# Patient Record
Sex: FEMALE | Race: WHITE | NOT HISPANIC OR LATINO | Employment: OTHER | URBAN - METROPOLITAN AREA
[De-identification: names, ages, dates, MRNs, and addresses within clinical notes are randomized per-mention and may not be internally consistent; named-entity substitution may affect disease eponyms.]

---

## 2017-01-15 ENCOUNTER — TELEPHONE (OUTPATIENT)
Dept: NEUROSURGERY | Facility: CLINIC | Age: 52
End: 2017-01-15

## 2017-01-15 DIAGNOSIS — M50.00 HERNIATION OF INTERVERTEBRAL DISC OF CERVICAL SPINE WITH MYELOPATHY: Primary | ICD-10-CM

## 2017-02-03 ENCOUNTER — TELEPHONE (OUTPATIENT)
Dept: NEUROSURGERY | Facility: CLINIC | Age: 52
End: 2017-02-03

## 2017-02-03 NOTE — TELEPHONE ENCOUNTER
----- Message from Gloria Harmon sent at 2/2/2017  8:55 AM CST -----  Contact: Yanely Sarkar (Daughter)  Pt is scheduled for surgery on Feb 23, daughter would like to know if she will be in a private room for recovery? Would like to know so she can see if she will need to get a hotel or if she can stay in the room with her mother    Contact number [phone]  Thanks

## 2017-02-03 NOTE — TELEPHONE ENCOUNTER
SPOKE WITH PATIENT'S DAUGHTER INFORMED HER THAT IT WAS NOT A GUARANTEE THAT HER MOTHER WOULD BE ASSIGNED TO A PRIVATE ROOM. IT WOULD DEPEND SOLELY UPON AVAILABLE.

## 2017-02-14 RX ORDER — HYDROCODONE BITARTRATE AND ACETAMINOPHEN 5; 325 MG/1; MG/1
1 TABLET ORAL EVERY 6 HOURS PRN
COMMUNITY
End: 2022-04-25

## 2017-02-14 NOTE — PRE ADMISSION SCREENING
Anesthesiology Preliminary RN Case Review for Triage    Planned Procedure: Procedure(s) (LRB):  CERVICAL TOTAL DISC REPLACEMENT (N/A) (N/A)  Requested Anesthesia Type: General  Surgeon: Derrick Lara MD  Known or anticipated Date of Surgery: 2/23/2017    Accessible information regarding current medical status:  Surgeon notes: reviewed  Anesthesia questionnaire completed by patient: not available  Previous anesthesia records: Not available  Last PCP note: within 3 months , outside [facility]  received visit note from Dr Mayela Hendrix 1/18/17  (f/u arm pain)   Subspecialty notes: n/a, n/a    Records from recent hospitalization: not available hysterectomy in November done in [place]   Outside medical records: received  RN preliminary phone screening: n/a  Medication list: reviewed     Risk analysis from chart review  Planned surgery / procedure risk classification:  High risk, [place] 4  Functional Capacity, (based on chart review): Good, at least 4 METS  Predicted ASA Classification: To be determined  Cardiac Status: There are risk factors for CAD:  Hyperlipoproteinemia, Hypertension,  adequately controlled   Other important medical co-morbidities: to be determined  Testing Status:  Results have been reviewed. received outside labs dated 12/14/16 CBC, PT/INR, BMP; EKG dated 10/21/16 - scanned to media   LHC done 12/14/16 after ABN EKG on 10/21/16 - NORMAL CORONARY ARTERIES - done in [place] by Dr Johnston- report scanned to media     Plan  Testing:  Indicated, per Anesthesia Triage Guidelines.  T&S on arrival   Phone call:  If no findings of concern, proceed with anesthesia and surgery.  Anesthesia Visit:  n/a   Visit focus:  n/a  Consult:  to be determined  Indications:  tbd  Sub-specialist consult:   n/a  Indications:   n/a    Donato Marte,MSN,BA, RN-BC 2/14/17

## 2017-02-16 ENCOUNTER — ANESTHESIA EVENT (OUTPATIENT)
Dept: SURGERY | Facility: HOSPITAL | Age: 52
End: 2017-02-16
Payer: MEDICAID

## 2017-02-16 NOTE — ANESTHESIA PREPROCEDURE EVALUATION
Anesthesiology Preliminary RN Case Review for Triage    Planned Procedure: Procedure(s) (LRB):  CERVICAL TOTAL DISC REPLACEMENT (N/A) (N/A)  Requested Anesthesia Type: General  Surgeon: Derrick Lara MD  Known or anticipated Date of Surgery: 2/23/2017    Accessible information regarding current medical status:  Surgeon notes: reviewed  Anesthesia questionnaire completed by patient: not available  Previous anesthesia records: Not available  Last PCP note: within 3 months , outside [facility]  received visit note from Dr Mayela Hendrix 1/18/17  (f/u arm pain)   Subspecialty notes: n/a, n/a    Records from recent hospitalization: not available hysterectomy in November done in [place]   Outside medical records: received  RN preliminary phone screening: n/a  Medication list: reviewed     Risk analysis from chart review  Planned surgery / procedure risk classification:  High risk, [place] 4  Functional Capacity, (based on chart review): Good, at least 4 METS  Predicted ASA Classification: To be determined  Cardiac Status: There are risk factors for CAD:  Hyperlipoproteinemia, Hypertension,  adequately controlled   Other important medical co-morbidities: to be determined  Testing Status:  Results have been reviewed. received outside labs dated 12/14/16 CBC, PT/INR, BMP; EKG dated 10/21/16 - scanned to media     LHC done 12/14/16 after ABN EKG on 10/21/16 - NORMAL CORONARY ARTERIES - done in [place] by Dr Johnston- report scanned to media     Plan  Testing:  Indicated, per Anesthesia Triage Guidelines.  T&S on arrival   Phone call:  If no findings of concern, proceed with anesthesia and surgery.  Anesthesia Visit:  n/a   Visit focus:  n/a  Consult:  to be determined  Indications:  tbd  Sub-specialist consult:   n/a  Indications:   n/a    Donato Marte,MSN,BA, RN-BC 2/14/17 02/16/2017  Pre-operative evaluation  for Procedure(s) (LRB):  CERVICAL TOTAL DISC REPLACEMENT (N/A)    Harriett Gasca is a 51 y.o. female with PMHx significant for GERD, HTN, and HLD who presents for the above procedure for cervical radiculopathy.    There is no problem list on file for this patient.      Review of patient's allergies indicates:   Allergen Reactions    Codeine Hives    Corticosteroids (glucocorticoids) Edema     Unsure of which steroids    Demerol [meperidine] Other (See Comments)     Adverse reaction     Pcn [penicillins]     Sulfa (sulfonamide antibiotics) Hives     Hives and trouble breathing        No current facility-administered medications on file prior to encounter.      Current Outpatient Prescriptions on File Prior to Encounter   Medication Sig Dispense Refill    atorvastatin (LIPITOR) 20 MG tablet Take 20 mg by mouth every evening.       cyclobenzaprine (FLEXERIL) 10 MG tablet Take 10 mg by mouth.      furosemide (LASIX) 20 MG tablet Take 20 mg by mouth daily as needed.       gabapentin (NEURONTIN) 300 MG capsule Take 300 mg by mouth 3 (three) times daily.      ibuprofen (ADVIL,MOTRIN) 800 MG tablet Take 800 mg by mouth.      losartan-hydrochlorothiazide 50-12.5 mg (HYZAAR) 50-12.5 mg per tablet Take 1 tablet by mouth once daily.      potassium chloride (KLOR-CON) 10 MEQ TbSR Take 20 mEq by mouth continuous prn.       ranitidine (ZANTAC) 150 MG capsule Take 150 mg by mouth 2 (two) times daily.       trazodone (DESYREL) 100 MG tablet Take 100 mg by mouth nightly as needed.          Past Surgical History   Procedure Laterality Date    Hysterectomy       section      Lung biopsy         Social History     Social History    Marital status: Legally      Spouse name: N/A    Number of children: N/A    Years of education: N/A     Occupational History    Not on file.     Social History Main Topics    Smoking status: Never Smoker    Smokeless tobacco: Not on file    Alcohol use No     Drug use: No    Sexual activity: Not on file     Other Topics Concern    Not on file     Social History Narrative         Vital Signs Range (Last 24H):  Temp:  [36.5 °C (97.7 °F)]   Pulse:  [105]   Resp:  [18]   BP: (137)/(60)   SpO2:  [100 %]       CBC: No results for input(s): WBC, RBC, HGB, HCT, PLT, MCV, MCH, MCHC in the last 72 hours.    CMP: No results for input(s): NA, K, CL, CO2, BUN, CREATININE, GLU, MG, PHOS, CALCIUM, ALBUMIN, PROT, ALKPHOS, ALT, AST, BILITOT in the last 72 hours.    INR  No results for input(s): INR, PROTIME, APTT in the last 72 hours.    Invalid input(s): PT        Diagnostic Studies:      EKG:  See above    2D Echo:  See above for nursing report from [facility] Anesthesia Evaluation    I have reviewed the Patient Summary Reports.     I have reviewed the Medications.     Review of Systems  Anesthesia Hx:  No problems with previous Anesthesia  History of prior surgery of interest to airway management or planning:   Hematology/Oncology:  Hematology Normal   Oncology Normal     EENT/Dental:EENT/Dental Normal   Cardiovascular:   Exercise tolerance: good Denies Hypertension.   Denies Angina.        Pulmonary:  Pulmonary Normal    Renal/:  Renal/ Normal     Hepatic/GI:   GERD, poorly controlled  Esophageal / Stomach Disorders Gerd Controlled by chronic antireflux medication.    Musculoskeletal:  Spine Disorders: cervical  Cervical Spine Disorder    OB/GYN/PEDS:  Hysterectomy November 2016 in [place] ; prior neck surgery   Neurological:  Neurology Normal    Endocrine:   Denies Diabetes.    Dermatological:  Skin Normal        Physical Exam  General:  Well nourished    Airway/Jaw/Neck:  Airway Findings: Mouth Opening: Normal Tongue: Normal  General Airway Assessment: Adult  Mallampati: II  Improves to I with phonation.  TM Distance: Normal, at least 6 cm  Jaw/Neck Findings:  Neck ROM: Normal ROM      Dental:  Dental Findings: Upper Dentures   Chest/Lungs:  Chest/Lungs Findings: Clear  to auscultation, Normal Respiratory Rate     Heart/Vascular:  Heart Findings: Rate: Normal  Rhythm: Regular Rhythm  Sounds: Normal        Mental Status:  Mental Status Findings:  Cooperative, Alert and Oriented         Anesthesia Plan  Type of Anesthesia, risks & benefits discussed:  Anesthesia Type:  general  Patient's Preference:   Intra-op Monitoring Plan:   Intra-op Monitoring Plan Comments:   Post Op Pain Control Plan:   Post Op Pain Control Plan Comments:   Induction:   IV  Beta Blocker:  Patient is not currently on a Beta-Blocker (No further documentation required).       Informed Consent: Patient understands risks and agrees with Anesthesia plan.  Questions answered. Anesthesia consent signed with patient.  ASA Score: 2     Day of Surgery Review of History & Physical:    H&P update referred to the surgeon.     Anesthesia Plan Notes: GA, ETT, anesthetic c/w neuromonitoring (TIVA)  preop antacid and metoclopramide, pain med        Ready For Surgery From Anesthesia Perspective.

## 2017-02-21 ENCOUNTER — PROCEDURE VISIT (OUTPATIENT)
Dept: NEUROLOGY | Facility: CLINIC | Age: 52
End: 2017-02-21
Payer: MEDICAID

## 2017-02-21 ENCOUNTER — LAB VISIT (OUTPATIENT)
Dept: LAB | Facility: HOSPITAL | Age: 52
End: 2017-02-21
Attending: ANESTHESIOLOGY
Payer: MEDICAID

## 2017-02-21 DIAGNOSIS — M50.10 CERVICAL DISC DISORDER WITH RADICULOPATHY: ICD-10-CM

## 2017-02-21 DIAGNOSIS — G56.03 CARPAL TUNNEL SYNDROME ON BOTH SIDES: ICD-10-CM

## 2017-02-21 DIAGNOSIS — Z01.818 PREOP TESTING: ICD-10-CM

## 2017-02-21 LAB
ABO + RH BLD: NORMAL
BLD GP AB SCN CELLS X3 SERPL QL: NORMAL

## 2017-02-21 PROCEDURE — 36415 COLL VENOUS BLD VENIPUNCTURE: CPT

## 2017-02-21 PROCEDURE — 95911 NRV CNDJ TEST 9-10 STUDIES: CPT | Mod: 26,S$PBB,, | Performed by: PSYCHIATRY & NEUROLOGY

## 2017-02-21 PROCEDURE — 86900 BLOOD TYPING SEROLOGIC ABO: CPT

## 2017-02-21 PROCEDURE — 86901 BLOOD TYPING SEROLOGIC RH(D): CPT

## 2017-02-21 PROCEDURE — 95885 MUSC TST DONE W/NERV TST LIM: CPT | Mod: 26,59,S$PBB, | Performed by: PSYCHIATRY & NEUROLOGY

## 2017-02-21 PROCEDURE — 95886 MUSC TEST DONE W/N TEST COMP: CPT | Mod: 26,S$PBB,, | Performed by: PSYCHIATRY & NEUROLOGY

## 2017-02-22 ENCOUNTER — TELEPHONE (OUTPATIENT)
Dept: NEUROSURGERY | Facility: CLINIC | Age: 52
End: 2017-02-22

## 2017-02-22 NOTE — PRE-PROCEDURE INSTRUCTIONS
Spoke to patient. Discussed preop and medication instructions; NPO instructions included. Patient verbalized understanding of instructions.

## 2017-02-23 ENCOUNTER — ANESTHESIA (OUTPATIENT)
Dept: SURGERY | Facility: HOSPITAL | Age: 52
End: 2017-02-23
Payer: MEDICAID

## 2017-02-23 ENCOUNTER — HOSPITAL ENCOUNTER (OUTPATIENT)
Facility: HOSPITAL | Age: 52
Discharge: HOME | End: 2017-02-24
Attending: NEUROLOGICAL SURGERY | Admitting: NEUROLOGICAL SURGERY
Payer: MEDICAID

## 2017-02-23 DIAGNOSIS — M48.02 CERVICAL STENOSIS OF SPINAL CANAL: ICD-10-CM

## 2017-02-23 LAB
ANION GAP SERPL CALC-SCNC: 9 MMOL/L
BASOPHILS # BLD AUTO: 0.01 K/UL
BASOPHILS NFR BLD: 0.2 %
BUN SERPL-MCNC: 13 MG/DL
CALCIUM SERPL-MCNC: 7.9 MG/DL
CHLORIDE SERPL-SCNC: 108 MMOL/L
CO2 SERPL-SCNC: 22 MMOL/L
CREAT SERPL-MCNC: 0.8 MG/DL
DIFFERENTIAL METHOD: ABNORMAL
EOSINOPHIL # BLD AUTO: 0.1 K/UL
EOSINOPHIL NFR BLD: 0.8 %
ERYTHROCYTE [DISTWIDTH] IN BLOOD BY AUTOMATED COUNT: 13.2 %
EST. GFR  (AFRICAN AMERICAN): >60 ML/MIN/1.73 M^2
EST. GFR  (NON AFRICAN AMERICAN): >60 ML/MIN/1.73 M^2
GLUCOSE SERPL-MCNC: 106 MG/DL
HCT VFR BLD AUTO: 37.5 %
HGB BLD-MCNC: 12.5 G/DL
LYMPHOCYTES # BLD AUTO: 0.9 K/UL
LYMPHOCYTES NFR BLD: 15.4 %
MCH RBC QN AUTO: 28.2 PG
MCHC RBC AUTO-ENTMCNC: 33.3 %
MCV RBC AUTO: 85 FL
MONOCYTES # BLD AUTO: 0.2 K/UL
MONOCYTES NFR BLD: 3 %
NEUTROPHILS # BLD AUTO: 4.8 K/UL
NEUTROPHILS NFR BLD: 80.6 %
PLATELET # BLD AUTO: 153 K/UL
PMV BLD AUTO: 8.6 FL
POTASSIUM SERPL-SCNC: 4.2 MMOL/L
RBC # BLD AUTO: 4.43 M/UL
SODIUM SERPL-SCNC: 139 MMOL/L
WBC # BLD AUTO: 5.91 K/UL

## 2017-02-23 PROCEDURE — 22858 TOT DISC ARTHRP 2ND LVL CRV: CPT | Mod: ,,, | Performed by: NEUROLOGICAL SURGERY

## 2017-02-23 PROCEDURE — 71000033 HC RECOVERY, INTIAL HOUR: Performed by: NEUROLOGICAL SURGERY

## 2017-02-23 PROCEDURE — 25000003 PHARM REV CODE 250: Performed by: ANESTHESIOLOGY

## 2017-02-23 PROCEDURE — 25000003 PHARM REV CODE 250: Performed by: NEUROLOGICAL SURGERY

## 2017-02-23 PROCEDURE — 37000008 HC ANESTHESIA 1ST 15 MINUTES: Performed by: NEUROLOGICAL SURGERY

## 2017-02-23 PROCEDURE — D9220A PRA ANESTHESIA: Mod: ,,, | Performed by: ANESTHESIOLOGY

## 2017-02-23 PROCEDURE — 27000221 HC OXYGEN, UP TO 24 HOURS

## 2017-02-23 PROCEDURE — 94799 UNLISTED PULMONARY SVC/PX: CPT

## 2017-02-23 PROCEDURE — 80048 BASIC METABOLIC PNL TOTAL CA: CPT

## 2017-02-23 PROCEDURE — 63600175 PHARM REV CODE 636 W HCPCS: Performed by: ANESTHESIOLOGY

## 2017-02-23 PROCEDURE — 27200651 HC AIRWAY, LMA: Performed by: ANESTHESIOLOGY

## 2017-02-23 PROCEDURE — 27201423 OPTIME MED/SURG SUP & DEVICES STERILE SUPPLY: Performed by: NEUROLOGICAL SURGERY

## 2017-02-23 PROCEDURE — C1729 CATH, DRAINAGE: HCPCS | Performed by: NEUROLOGICAL SURGERY

## 2017-02-23 PROCEDURE — 36000710: Performed by: NEUROLOGICAL SURGERY

## 2017-02-23 PROCEDURE — 22856 TOT DISC ARTHRP 1NTRSPC CRV: CPT | Mod: ,,, | Performed by: NEUROLOGICAL SURGERY

## 2017-02-23 PROCEDURE — 36000711: Performed by: NEUROLOGICAL SURGERY

## 2017-02-23 PROCEDURE — 37000009 HC ANESTHESIA EA ADD 15 MINS: Performed by: NEUROLOGICAL SURGERY

## 2017-02-23 PROCEDURE — C1713 ANCHOR/SCREW BN/BN,TIS/BN: HCPCS | Performed by: NEUROLOGICAL SURGERY

## 2017-02-23 PROCEDURE — 85025 COMPLETE CBC W/AUTO DIFF WBC: CPT

## 2017-02-23 PROCEDURE — 27100025 HC TUBING, SET FLUID WARMER: Performed by: ANESTHESIOLOGY

## 2017-02-23 PROCEDURE — 94761 N-INVAS EAR/PLS OXIMETRY MLT: CPT

## 2017-02-23 PROCEDURE — 71000039 HC RECOVERY, EACH ADD'L HOUR: Performed by: NEUROLOGICAL SURGERY

## 2017-02-23 PROCEDURE — 63600175 PHARM REV CODE 636 W HCPCS: Performed by: NEUROLOGICAL SURGERY

## 2017-02-23 PROCEDURE — 27100021 HC MULTIPORT INFUSION MANIFOLD: Performed by: ANESTHESIOLOGY

## 2017-02-23 DEVICE — DISC CERVICAL MOBI-C 13X15X5MM: Type: IMPLANTABLE DEVICE | Site: NECK | Status: FUNCTIONAL

## 2017-02-23 DEVICE — IMPLANTABLE DEVICE: Type: IMPLANTABLE DEVICE | Site: NECK | Status: FUNCTIONAL

## 2017-02-23 RX ORDER — ROCURONIUM BROMIDE 10 MG/ML
INJECTION, SOLUTION INTRAVENOUS
Status: DISCONTINUED | OUTPATIENT
Start: 2017-02-23 | End: 2017-02-23

## 2017-02-23 RX ORDER — BACITRACIN 50000 [IU]/1
INJECTION, POWDER, FOR SOLUTION INTRAMUSCULAR
Status: DISCONTINUED | OUTPATIENT
Start: 2017-02-23 | End: 2017-02-23 | Stop reason: HOSPADM

## 2017-02-23 RX ORDER — PHENYLEPHRINE HYDROCHLORIDE 10 MG/ML
INJECTION INTRAVENOUS
Status: DISCONTINUED | OUTPATIENT
Start: 2017-02-23 | End: 2017-02-23

## 2017-02-23 RX ORDER — LORAZEPAM 2 MG/ML
0.25 INJECTION INTRAMUSCULAR ONCE AS NEEDED
Status: DISCONTINUED | OUTPATIENT
Start: 2017-02-23 | End: 2017-02-23 | Stop reason: HOSPADM

## 2017-02-23 RX ORDER — HYDROCODONE BITARTRATE AND ACETAMINOPHEN 5; 325 MG/1; MG/1
1 TABLET ORAL EVERY 4 HOURS PRN
Status: DISCONTINUED | OUTPATIENT
Start: 2017-02-23 | End: 2017-02-24 | Stop reason: HOSPADM

## 2017-02-23 RX ORDER — SODIUM CHLORIDE 0.9 % (FLUSH) 0.9 %
3 SYRINGE (ML) INJECTION
Status: DISCONTINUED | OUTPATIENT
Start: 2017-02-23 | End: 2017-02-23

## 2017-02-23 RX ORDER — BISACODYL 10 MG
10 SUPPOSITORY, RECTAL RECTAL DAILY
Status: DISCONTINUED | OUTPATIENT
Start: 2017-02-24 | End: 2017-02-24 | Stop reason: HOSPADM

## 2017-02-23 RX ORDER — MAG HYDROX/ALUMINUM HYD/SIMETH 200-200-20
30 SUSPENSION, ORAL (FINAL DOSE FORM) ORAL EVERY 4 HOURS PRN
Status: DISCONTINUED | OUTPATIENT
Start: 2017-02-23 | End: 2017-02-24 | Stop reason: HOSPADM

## 2017-02-23 RX ORDER — LIDOCAINE HYDROCHLORIDE 10 MG/ML
1 INJECTION, SOLUTION EPIDURAL; INFILTRATION; INTRACAUDAL; PERINEURAL ONCE
Status: COMPLETED | OUTPATIENT
Start: 2017-02-23 | End: 2017-02-23

## 2017-02-23 RX ORDER — METOCLOPRAMIDE HYDROCHLORIDE 5 MG/ML
10 INJECTION INTRAMUSCULAR; INTRAVENOUS EVERY 10 MIN PRN
Status: DISCONTINUED | OUTPATIENT
Start: 2017-02-23 | End: 2017-02-23 | Stop reason: HOSPADM

## 2017-02-23 RX ORDER — ESTRADIOL 1 MG/1
1 TABLET ORAL DAILY
COMMUNITY

## 2017-02-23 RX ORDER — METHYLPREDNISOLONE ACETATE 40 MG/ML
INJECTION, SUSPENSION INTRA-ARTICULAR; INTRALESIONAL; INTRAMUSCULAR; SOFT TISSUE
Status: DISCONTINUED | OUTPATIENT
Start: 2017-02-23 | End: 2017-02-23 | Stop reason: HOSPADM

## 2017-02-23 RX ORDER — METOCLOPRAMIDE HYDROCHLORIDE 5 MG/ML
10 INJECTION INTRAMUSCULAR; INTRAVENOUS ONCE
Status: COMPLETED | OUTPATIENT
Start: 2017-02-23 | End: 2017-02-23

## 2017-02-23 RX ORDER — ACETAMINOPHEN 325 MG/1
650 TABLET ORAL EVERY 4 HOURS PRN
Status: DISCONTINUED | OUTPATIENT
Start: 2017-02-23 | End: 2017-02-24 | Stop reason: HOSPADM

## 2017-02-23 RX ORDER — ACETAMINOPHEN 10 MG/ML
INJECTION, SOLUTION INTRAVENOUS
Status: DISCONTINUED | OUTPATIENT
Start: 2017-02-23 | End: 2017-02-23

## 2017-02-23 RX ORDER — SUCCINYLCHOLINE CHLORIDE 20 MG/ML
INJECTION INTRAMUSCULAR; INTRAVENOUS
Status: DISCONTINUED | OUTPATIENT
Start: 2017-02-23 | End: 2017-02-23

## 2017-02-23 RX ORDER — CLINDAMYCIN PHOSPHATE 900 MG/50ML
INJECTION, SOLUTION INTRAVENOUS
Status: DISCONTINUED | OUTPATIENT
Start: 2017-02-23 | End: 2017-02-23

## 2017-02-23 RX ORDER — HYDROMORPHONE HYDROCHLORIDE 1 MG/ML
0.2 INJECTION, SOLUTION INTRAMUSCULAR; INTRAVENOUS; SUBCUTANEOUS EVERY 5 MIN PRN
Status: DISCONTINUED | OUTPATIENT
Start: 2017-02-23 | End: 2017-02-23 | Stop reason: HOSPADM

## 2017-02-23 RX ORDER — LOSARTAN POTASSIUM AND HYDROCHLOROTHIAZIDE 12.5; 5 MG/1; MG/1
1 TABLET ORAL DAILY
Status: DISCONTINUED | OUTPATIENT
Start: 2017-02-24 | End: 2017-02-24 | Stop reason: HOSPADM

## 2017-02-23 RX ORDER — PROPOFOL 10 MG/ML
VIAL (ML) INTRAVENOUS
Status: DISCONTINUED | OUTPATIENT
Start: 2017-02-23 | End: 2017-02-23

## 2017-02-23 RX ORDER — FENTANYL CITRATE 50 UG/ML
25 INJECTION, SOLUTION INTRAMUSCULAR; INTRAVENOUS EVERY 5 MIN PRN
Status: COMPLETED | OUTPATIENT
Start: 2017-02-23 | End: 2017-02-23

## 2017-02-23 RX ORDER — OXYCODONE HYDROCHLORIDE 5 MG/1
5 TABLET ORAL EVERY 30 MIN PRN
Status: COMPLETED | OUTPATIENT
Start: 2017-02-23 | End: 2017-02-23

## 2017-02-23 RX ORDER — ONDANSETRON 8 MG/1
8 TABLET, ORALLY DISINTEGRATING ORAL EVERY 6 HOURS PRN
Status: DISCONTINUED | OUTPATIENT
Start: 2017-02-23 | End: 2017-02-24 | Stop reason: HOSPADM

## 2017-02-23 RX ORDER — SODIUM CITRATE AND CITRIC ACID MONOHYDRATE 334; 500 MG/5ML; MG/5ML
30 SOLUTION ORAL ONCE
Status: COMPLETED | OUTPATIENT
Start: 2017-02-23 | End: 2017-02-23

## 2017-02-23 RX ORDER — MIDAZOLAM HYDROCHLORIDE 1 MG/ML
INJECTION INTRAMUSCULAR; INTRAVENOUS
Status: DISCONTINUED | OUTPATIENT
Start: 2017-02-23 | End: 2017-02-23

## 2017-02-23 RX ORDER — FUROSEMIDE 20 MG/1
20 TABLET ORAL DAILY
Status: DISCONTINUED | OUTPATIENT
Start: 2017-02-24 | End: 2017-02-24 | Stop reason: HOSPADM

## 2017-02-23 RX ORDER — AMOXICILLIN 250 MG
2 CAPSULE ORAL NIGHTLY PRN
Status: DISCONTINUED | OUTPATIENT
Start: 2017-02-23 | End: 2017-02-24 | Stop reason: HOSPADM

## 2017-02-23 RX ORDER — LIDOCAINE HCL/PF 100 MG/5ML
SYRINGE (ML) INTRAVENOUS
Status: DISCONTINUED | OUTPATIENT
Start: 2017-02-23 | End: 2017-02-23

## 2017-02-23 RX ORDER — SODIUM CHLORIDE 9 MG/ML
INJECTION, SOLUTION INTRAVENOUS CONTINUOUS
Status: DISCONTINUED | OUTPATIENT
Start: 2017-02-23 | End: 2017-02-23

## 2017-02-23 RX ORDER — GABAPENTIN 300 MG/1
300 CAPSULE ORAL 3 TIMES DAILY
Status: DISCONTINUED | OUTPATIENT
Start: 2017-02-23 | End: 2017-02-24 | Stop reason: HOSPADM

## 2017-02-23 RX ORDER — PROPOFOL 10 MG/ML
VIAL (ML) INTRAVENOUS CONTINUOUS PRN
Status: DISCONTINUED | OUTPATIENT
Start: 2017-02-23 | End: 2017-02-23

## 2017-02-23 RX ORDER — OXYCODONE HYDROCHLORIDE 5 MG/1
5 TABLET ORAL EVERY 6 HOURS PRN
Status: DISCONTINUED | OUTPATIENT
Start: 2017-02-23 | End: 2017-02-24 | Stop reason: HOSPADM

## 2017-02-23 RX ORDER — FENTANYL CITRATE 50 UG/ML
50 INJECTION, SOLUTION INTRAMUSCULAR; INTRAVENOUS
Status: DISCONTINUED | OUTPATIENT
Start: 2017-02-23 | End: 2017-02-24 | Stop reason: HOSPADM

## 2017-02-23 RX ORDER — ATORVASTATIN CALCIUM 20 MG/1
20 TABLET, FILM COATED ORAL NIGHTLY
Status: DISCONTINUED | OUTPATIENT
Start: 2017-02-23 | End: 2017-02-24 | Stop reason: HOSPADM

## 2017-02-23 RX ORDER — SODIUM CHLORIDE AND POTASSIUM CHLORIDE 150; 900 MG/100ML; MG/100ML
INJECTION, SOLUTION INTRAVENOUS CONTINUOUS
Status: DISCONTINUED | OUTPATIENT
Start: 2017-02-23 | End: 2017-02-24 | Stop reason: HOSPADM

## 2017-02-23 RX ORDER — LIDOCAINE HYDROCHLORIDE AND EPINEPHRINE 10; 10 MG/ML; UG/ML
INJECTION, SOLUTION INFILTRATION; PERINEURAL
Status: DISCONTINUED | OUTPATIENT
Start: 2017-02-23 | End: 2017-02-23 | Stop reason: HOSPADM

## 2017-02-23 RX ORDER — KETAMINE HYDROCHLORIDE 100 MG/ML
INJECTION, SOLUTION INTRAMUSCULAR; INTRAVENOUS
Status: DISCONTINUED | OUTPATIENT
Start: 2017-02-23 | End: 2017-02-23

## 2017-02-23 RX ORDER — MORPHINE SULFATE 2 MG/ML
2 INJECTION, SOLUTION INTRAMUSCULAR; INTRAVENOUS
Status: DISCONTINUED | OUTPATIENT
Start: 2017-02-23 | End: 2017-02-24 | Stop reason: HOSPADM

## 2017-02-23 RX ORDER — BACITRACIN ZINC 500 UNIT/G
OINTMENT (GRAM) TOPICAL
Status: DISCONTINUED | OUTPATIENT
Start: 2017-02-23 | End: 2017-02-23 | Stop reason: HOSPADM

## 2017-02-23 RX ORDER — FENTANYL CITRATE 50 UG/ML
INJECTION, SOLUTION INTRAMUSCULAR; INTRAVENOUS
Status: DISCONTINUED | OUTPATIENT
Start: 2017-02-23 | End: 2017-02-23

## 2017-02-23 RX ORDER — ONDANSETRON 2 MG/ML
4 INJECTION INTRAMUSCULAR; INTRAVENOUS DAILY PRN
Status: DISCONTINUED | OUTPATIENT
Start: 2017-02-23 | End: 2017-02-23 | Stop reason: HOSPADM

## 2017-02-23 RX ORDER — DEXAMETHASONE SODIUM PHOSPHATE 4 MG/ML
INJECTION, SOLUTION INTRA-ARTICULAR; INTRALESIONAL; INTRAMUSCULAR; INTRAVENOUS; SOFT TISSUE
Status: DISCONTINUED | OUTPATIENT
Start: 2017-02-23 | End: 2017-02-23

## 2017-02-23 RX ORDER — CLINDAMYCIN PHOSPHATE 900 MG/50ML
900 INJECTION, SOLUTION INTRAVENOUS
Status: DISCONTINUED | OUTPATIENT
Start: 2017-02-24 | End: 2017-02-24 | Stop reason: HOSPADM

## 2017-02-23 RX ADMIN — HYDROMORPHONE HYDROCHLORIDE 0.2 MG: 1 INJECTION, SOLUTION INTRAMUSCULAR; INTRAVENOUS; SUBCUTANEOUS at 04:02

## 2017-02-23 RX ADMIN — FENTANYL CITRATE 50 MCG: 50 INJECTION, SOLUTION INTRAMUSCULAR; INTRAVENOUS at 03:02

## 2017-02-23 RX ADMIN — FENTANYL CITRATE 25 MCG: 50 INJECTION INTRAMUSCULAR; INTRAVENOUS at 03:02

## 2017-02-23 RX ADMIN — PROPOFOL 200 MG: 10 INJECTION, EMULSION INTRAVENOUS at 11:02

## 2017-02-23 RX ADMIN — ATORVASTATIN CALCIUM 20 MG: 20 TABLET, FILM COATED ORAL at 09:02

## 2017-02-23 RX ADMIN — MIDAZOLAM HYDROCHLORIDE 2 MG: 1 INJECTION, SOLUTION INTRAMUSCULAR; INTRAVENOUS at 11:02

## 2017-02-23 RX ADMIN — SODIUM CITRATE AND CITRIC ACID MONOHYDRATE 15 ML: 500; 334 SOLUTION ORAL at 10:02

## 2017-02-23 RX ADMIN — ONDANSETRON 8 MG: 8 TABLET, ORALLY DISINTEGRATING ORAL at 09:02

## 2017-02-23 RX ADMIN — KETAMINE HYDROCHLORIDE 35 MG: 100 INJECTION, SOLUTION, CONCENTRATE INTRAMUSCULAR; INTRAVENOUS at 02:02

## 2017-02-23 RX ADMIN — DEXAMETHASONE SODIUM PHOSPHATE 10 MG: 4 INJECTION, SOLUTION INTRAMUSCULAR; INTRAVENOUS at 02:02

## 2017-02-23 RX ADMIN — ACETAMINOPHEN 1000 MG: 10 INJECTION, SOLUTION INTRAVENOUS at 02:02

## 2017-02-23 RX ADMIN — PHENYLEPHRINE HYDROCHLORIDE 100 MCG: 10 INJECTION INTRAVENOUS at 12:02

## 2017-02-23 RX ADMIN — FENTANYL CITRATE 50 MCG: 50 INJECTION INTRAMUSCULAR; INTRAVENOUS at 11:02

## 2017-02-23 RX ADMIN — TRAZODONE HYDROCHLORIDE 100 MG: 50 TABLET ORAL at 10:02

## 2017-02-23 RX ADMIN — PROPOFOL 175 MCG/KG/MIN: 10 INJECTION, EMULSION INTRAVENOUS at 11:02

## 2017-02-23 RX ADMIN — SODIUM CHLORIDE: 0.9 INJECTION, SOLUTION INTRAVENOUS at 08:02

## 2017-02-23 RX ADMIN — SODIUM CHLORIDE, SODIUM GLUCONATE, SODIUM ACETATE, POTASSIUM CHLORIDE, MAGNESIUM CHLORIDE, SODIUM PHOSPHATE, DIBASIC, AND POTASSIUM PHOSPHATE: .53; .5; .37; .037; .03; .012; .00082 INJECTION, SOLUTION INTRAVENOUS at 12:02

## 2017-02-23 RX ADMIN — OXYCODONE HYDROCHLORIDE 5 MG: 5 TABLET ORAL at 03:02

## 2017-02-23 RX ADMIN — LIDOCAINE HYDROCHLORIDE 100 MG: 20 INJECTION, SOLUTION INTRAVENOUS at 11:02

## 2017-02-23 RX ADMIN — MORPHINE SULFATE 2 MG: 2 INJECTION, SOLUTION INTRAMUSCULAR; INTRAVENOUS at 09:02

## 2017-02-23 RX ADMIN — METOCLOPRAMIDE 10 MG: 5 INJECTION, SOLUTION INTRAMUSCULAR; INTRAVENOUS at 10:02

## 2017-02-23 RX ADMIN — ROCURONIUM BROMIDE 5 MG: 10 INJECTION, SOLUTION INTRAVENOUS at 11:02

## 2017-02-23 RX ADMIN — OXYCODONE HYDROCHLORIDE 5 MG: 5 TABLET ORAL at 10:02

## 2017-02-23 RX ADMIN — SUCCINYLCHOLINE CHLORIDE 140 MG: 20 INJECTION, SOLUTION INTRAMUSCULAR; INTRAVENOUS at 11:02

## 2017-02-23 RX ADMIN — GABAPENTIN 300 MG: 300 CAPSULE ORAL at 09:02

## 2017-02-23 RX ADMIN — PHENYLEPHRINE HYDROCHLORIDE 0.5 MCG/KG/MIN: 10 INJECTION INTRAVENOUS at 12:02

## 2017-02-23 RX ADMIN — HYDROCODONE BITARTRATE AND ACETAMINOPHEN 1 TABLET: 5; 325 TABLET ORAL at 06:02

## 2017-02-23 RX ADMIN — OXYCODONE HYDROCHLORIDE 5 MG: 5 TABLET ORAL at 04:02

## 2017-02-23 RX ADMIN — CLINDAMYCIN PHOSPHATE 900 MG: 18 INJECTION, SOLUTION INTRAVENOUS at 12:02

## 2017-02-23 RX ADMIN — REMIFENTANIL HYDROCHLORIDE 0.4 MCG/KG/MIN: 1 INJECTION, POWDER, LYOPHILIZED, FOR SOLUTION INTRAVENOUS at 11:02

## 2017-02-23 RX ADMIN — PROPOFOL 50 MG: 10 INJECTION, EMULSION INTRAVENOUS at 12:02

## 2017-02-23 RX ADMIN — SODIUM CHLORIDE, SODIUM GLUCONATE, SODIUM ACETATE, POTASSIUM CHLORIDE, MAGNESIUM CHLORIDE, SODIUM PHOSPHATE, DIBASIC, AND POTASSIUM PHOSPHATE: .53; .5; .37; .037; .03; .012; .00082 INJECTION, SOLUTION INTRAVENOUS at 11:02

## 2017-02-23 RX ADMIN — LIDOCAINE HYDROCHLORIDE 0.2 MG: 10 INJECTION, SOLUTION EPIDURAL; INFILTRATION; INTRACAUDAL; PERINEURAL at 08:02

## 2017-02-23 RX ADMIN — SODIUM CHLORIDE AND POTASSIUM CHLORIDE: .9; .15 SOLUTION INTRAVENOUS at 03:02

## 2017-02-23 NOTE — TRANSFER OF CARE
"Anesthesia Transfer of Care Note    Patient: Harriett Gasca    Procedure(s) Performed: Procedure(s) (LRB):  CERVICAL TOTAL DISC REPLACEMENT (N/A)    Patient location: PACU    Anesthesia Type: general    Transport from OR: Transported from OR on room air with adequate spontaneous ventilation    Post pain: adequate analgesia    Post assessment: no apparent anesthetic complications    Post vital signs: stable    Level of consciousness: awake and alert    Nausea/Vomiting: no nausea/vomiting    Complications: none          Last vitals:   Visit Vitals    /89 (BP Location: Right arm, Patient Position: Lying, BP Method: Automatic)    Pulse 94    Temp 37.4 °C (99.3 °F) (Axillary)    Resp 20    Ht 5' 4" (1.626 m)    Wt 78.5 kg (173 lb)    SpO2 100%    Breastfeeding No    BMI 29.7 kg/m2     "

## 2017-02-23 NOTE — ANESTHESIA RELEASE NOTE
"Anesthesia Release from PACU Note    Patient: Harriett Gasca    Procedure(s) Performed: Procedure(s) (LRB):  CERVICAL TOTAL DISC REPLACEMENT (N/A)    Anesthesia type: general    Post pain: Adequate analgesia    Post assessment: no apparent anesthetic complications    Last Vitals:   Visit Vitals    BP (!) 146/76    Pulse 96    Temp 37.4 °C (99.3 °F) (Axillary)    Resp 15    Ht 5' 4" (1.626 m)    Wt 78.5 kg (173 lb)    SpO2 (!) 93%    Breastfeeding No    BMI 29.7 kg/m2       Post vital signs: stable    Level of consciousness: awake    Nausea/Vomiting: no nausea/no vomiting    Complications: none    Airway Patency: patent    Respiratory: unassisted    Cardiovascular: stable and blood pressure at baseline    Hydration: euvolemic  "

## 2017-02-23 NOTE — H&P
[facility]  History & Physical  Neurosurgery    SUBJECTIVE:     Chief Complaint/Reason for Admission: elective surgery    History of Present Illness:  Pt is a 51 y.o. female who presents per referral by Mayela Hendrix for evaluation of cervical radiculopathy. Pt complains of neck pain radiating to bilateral upper extremities ongoing since 2009. Pt states doing a lot of housework and pain progressively worsening over the past few months. She denies having any treatment for this previously, no physical therapy, no injections. Arm pain is intermittently worse on either side but is overall equal. She has pain distal to bilateral elbows and denies any pain at the shoulders. She reports most severe pain in her hands, with bilateral hand weakness. She states dropping objects (pens, toothbrush) and secondary sleep disturbances. Pt had been previously followed at [facility] for carpal tunnel. She is a former smoker for 1 year.       PTA Medications   Medication Sig    atorvastatin (LIPITOR) 20 MG tablet Take 20 mg by mouth every evening.     cyclobenzaprine (FLEXERIL) 10 MG tablet Take 10 mg by mouth.    estradiol (ESTRACE) 1 MG tablet Take 1 mg by mouth once daily.    furosemide (LASIX) 20 MG tablet Take 20 mg by mouth daily as needed.     gabapentin (NEURONTIN) 300 MG capsule Take 300 mg by mouth 3 (three) times daily.    hydrocodone-acetaminophen 5-325mg (NORCO) 5-325 mg per tablet Take 1 tablet by mouth every 6 (six) hours as needed for Pain.    ibuprofen (ADVIL,MOTRIN) 800 MG tablet Take 800 mg by mouth.    losartan-hydrochlorothiazide 50-12.5 mg (HYZAAR) 50-12.5 mg per tablet Take 1 tablet by mouth once daily.    potassium chloride (KLOR-CON) 10 MEQ TbSR Take 20 mEq by mouth continuous prn.     ranitidine (ZANTAC) 150 MG capsule Take 150 mg by mouth 2 (two) times daily.     trazodone (DESYREL) 100 MG tablet Take 100 mg by mouth nightly as needed.        Review of patient's allergies  indicates:   Allergen Reactions    Codeine Hives    Corticosteroids (glucocorticoids) Edema     Unsure of which steroids    Demerol [meperidine] Other (See Comments)     Adverse reaction     Pcn [penicillins]     Sulfa (sulfonamide antibiotics) Hives     Hives and trouble breathing        Past Medical History   Diagnosis Date    Hyperlipidemia     Hypertension     Sarcoid      Past Surgical History   Procedure Laterality Date    Hysterectomy       section      Lung biopsy       History reviewed. No pertinent family history.  Social History   Substance Use Topics    Smoking status: Never Smoker    Smokeless tobacco: None    Alcohol use No        Review of Systems:   Constitutional: Negative for chills, fatigue and fever.   HENT: Negative for sinus pressure and trouble swallowing.   Eyes: Negative. Negative for visual disturbance.   Respiratory: Negative.   Cardiovascular: Negative.   Gastrointestinal: Negative. Negative for nausea and vomiting.   Endocrine: Negative.   Genitourinary: Negative.   Musculoskeletal: Positive for neck pain.   Positive for bilateral forearm and hand pain.    Neurological: Positive for weakness (bilateral hands). Negative for dizziness, seizures, syncope, speech difficulty and numbness.   Psychiatric/Behavioral: Positive for sleep disturbance (secondary to hand pain).         OBJECTIVE:     Vital Signs (Most Recent):  Temp: 97.7 °F (36.5 °C) (17)  Pulse: 105 (17)  Resp: 18 (17)  BP: 137/60 (17)  SpO2: 100 % (17)    Physical Exam:  Objective:      Physical Exam:     Constitutional: She appears well-developed.      Eyes: Pupils are equal, round, and reactive to light. Conjunctivae and EOM are normal.      Cardiovascular: Normal rate, regular rhythm, normal pulses and intact distal pulses.      Abdominal: Soft.     Psych/Behavior: She is alert. She is oriented to person, place, and time. She has a normal mood and  affect.     Musculoskeletal: Gait is normal.   Neck: Range of motion is full. There is no tenderness. Muscle strength is 5/5. Tone is normal.   Back: Range of motion is full. There is no tenderness. Muscle strength is 5/5. Tone is normal.   Right Upper Extremities: Range of motion is full. There is no tenderness. Muscle strength is 5/5. Tone is normal.   Left Upper Extremities: Range of motion is full. There is no tenderness. Muscle strength is 5/5. Tone is normal.   Right Lower Extremities: Range of motion is full. There is no tenderness. Muscle strength is 5/5. Tone is normal.   Left Lower Extremities: Range of motion is full. There is no tenderness. Muscle strength is 5/5. Tone is normal.     Neurological:   Coordination: She has a normal Romberg Test, normal finger to nose coordination, normal heel to shin coordination and normal tandem walking coordination.   Sensory: There is no sensory deficit in the trunk. There is no sensory deficit in the extremities.   DTRs: DTRs are normal. Tricep reflexes are 2+ on the right side and 2+ on the left side. Bicep reflexes are 2+ on the right side and 2+ on the left side. Brachioradialis reflexes are 2+ on the right side and 2+ on the left side. Patellar reflexes are 2+ on the right side and 2+ on the left side. Achilles reflexes are 2+ on the right side and 2+ on the left side. She displays no Babinski's sign on the right side. She displays no Babinski's sign on the left side.   Cranial nerves: Cranial nerve(s) II, III, IV, V, VI, VII, VIII, IX, X, XI and XII are intact.       Pt has neck and bilateral arm and hand pain. She has right-sided radiculopathy but also carpal tunnel.   She has no mercado's but she does have weakness of both hand intrinsics.   She does have positive Tinel's sign at both wrists, right worse than left.   Radiculopathy does appear to fit a C6 and C7 distribution.   Limited ROM in the neck.  Full strength in the rest of the muscle groups.      Laboratory:  CBC: No results for input(s): WBC, RBC, HGB, HCT, PLT, MCV, MCH, MCHC in the last 168 hours.  BMP: No results for input(s): GLU, NA, K, CL, CO2, BUN, CREATININE, CALCIUM, MG in the last 168 hours.  CMP: No results for input(s): GLU, CALCIUM, ALBUMIN, PROT, NA, K, CO2, CL, BUN, CREATININE, ALKPHOS, ALT, AST, BILITOT in the last 168 hours.  LFTs: No results for input(s): ALT, AST, ALKPHOS, BILITOT, PROT, ALBUMIN in the last 168 hours.  Coagulation: No results for input(s): INR, APTT in the last 168 hours.    Invalid input(s): PT  Cardiac markers: No results for input(s): CKMB, CPKMB, TROPONINT, TROPONINI, MYOGLOBIN in the last 168 hours.  ABGs: No results for input(s): PH, PCO2, PO2, HCO3, POCSATURATED, BE in the last 168 hours.  Microbiology Results (last 7 days)     ** No results found for the last 168 hours. **        Specimen     None        No results for input(s): COLORU, CLARITYU, SPECGRAV, PHUR, PROTEINUA, GLUCOSEU, BILIRUBINCON, BLOODU, WBCU, RBCU, BACTERIA, MUCUS, NITRITE, LEUKOCYTESUR, UROBILINOGEN, HYALINECASTS in the last 168 hours.  Labs from OSH records scanned in cristóbal- reviewed.      Imaging:   MRI C-spine, from outside facility, dated 06/09/2016, shows loss of cervical lordosis, C5-6 and C6-7 right paracentral disc herniations with cord compression and early cord signal changes.      Assessment/Plan:   Pt with 2 large cervical disc herniations with cord compression and signs of radiculopathy but also has signs of carpal tunnel. Pt has failed conservative management so far and has been symptomatic for over a year and a half. I think she is a good candidate for disc arthroplasty. she is a good candidate for cervical disc arthroplasty at C5-6 and C6-7. I have discussed the risks/benefits, indications, and alternatives for the proposed procedure in detail. I have answered all of their questions and patient wishes to proceed with surgery.

## 2017-02-23 NOTE — ANESTHESIA POSTPROCEDURE EVALUATION
"Anesthesia Post Evaluation    Patient: Harriett aGsca    Procedure(s) Performed: Procedure(s) (LRB):  CERVICAL TOTAL DISC REPLACEMENT (N/A)    Final Anesthesia Type: general  Patient location during evaluation: PACU  Patient participation: Yes- Able to Participate  Level of consciousness: awake and alert  Pain management: adequate  Airway patency: patent  PONV status at discharge: No PONV  Anesthetic complications: no      Cardiovascular status: blood pressure returned to baseline  Respiratory status: unassisted  Hydration status: euvolemic  Follow-up not needed.        Visit Vitals    BP (!) 146/76    Pulse 96    Temp 37.4 °C (99.3 °F) (Axillary)    Resp 15    Ht 5' 4" (1.626 m)    Wt 78.5 kg (173 lb)    SpO2 (!) 93%    Breastfeeding No    BMI 29.7 kg/m2       Pain/Tracy Score: Pain Assessment Performed: Yes (2/23/2017  4:30 PM)  Presence of Pain: complains of pain/discomfort (2/23/2017  4:30 PM)  Pain Rating Prior to Med Admin: 8 (2/23/2017  4:30 PM)  Tracy Score: 10 (2/23/2017  4:30 PM)      "

## 2017-02-23 NOTE — PROGRESS NOTES
Spoke with Coy in pharmacy for ordered dose of Vancomycin.  Request sent as well. She states medication request received, it has to be compounded and will be sent up. Verbalized understanding.

## 2017-02-23 NOTE — PROGRESS NOTES
Patient awake and alert. C/o pain to neck and bilateral hand but tolerable. Voice no other complaints. No s/s of distress noted. Daughter at bedside. All questions addressed. Verbalized understanding.

## 2017-02-23 NOTE — PROGRESS NOTES
Patient awake and alert. Voice no complaints. No s/s of distress noted. Daughter at bedside. Will continue to monitor patient.

## 2017-02-23 NOTE — IP AVS SNAPSHOT
[facility]  [address]  [facility] [address]  Phone: [phone]           Patient Discharge Instructions     Our goal is to set you up for success. This packet includes information on your condition, medications, and your home care. It will help you to care for yourself so you don't get sicker and need to go back to the hospital.     Please ask your nurse if you have any questions.        There are many details to remember when preparing to leave the hospital. Here is what you will need to do:    1. Take your medicine. If you are prescribed medications, review your Medication List in the following pages. You may have new medications to  at the pharmacy and others that you'll need to stop taking. Review the instructions for how and when to take your medications. Talk with your doctor or nurses if you are unsure of what to do.     2. Go to your follow-up appointments. Specific follow-up information is listed in the following pages. Your may be contacted by a transition nurse or clinical provider about future appointments. Be sure we have all of the phone numbers to reach you, if needed. Please contact your provider's office if you are unable to make an appointment.     3. Watch for warning signs. Your doctor or nurse will give you detailed warning signs to watch for and when to call for assistance. These instructions may also include educational information about your condition. If you experience any of warning signs to your health, call your doctor.               [facility]  Unless otherwise directed by your provider, please contact [facility], our nurse care line that is available for 24/7 assistance.     [phone] (toll-free)    Registered nurses in the [facility] provide clinical advisement, health education, appointment booking, and other advisory services.                    ** Verify the list of medication(s) below is accurate and up  to date. Carry this with you in case of emergency. If your medications have changed, please notify your healthcare provider.             Medication List      START taking these medications        Additional Info                      naproxen 500 MG tablet   Commonly known as:  NAPROSYN   Quantity:  28 tablet   Refills:  0   Dose:  500 mg    Last time this was given:  500 mg on 2/24/2017  9:40 AM   Instructions:  Take 1 tablet (500 mg total) by mouth 2 (two) times daily with meals.     Begin Date    AM    Noon    PM    Bedtime       ondansetron 8 MG Tbdl   Commonly known as:  ZOFRAN-ODT   Quantity:  30 tablet   Refills:  0   Dose:  8 mg    Last time this was given:  8 mg on 2/23/2017  9:35 PM   Instructions:  Take 1 tablet (8 mg total) by mouth every 6 (six) hours as needed (nausea, vomiting).     Begin Date    AM    Noon    PM    Bedtime       oxycodone 5 MG immediate release tablet   Commonly known as:  ROXICODONE   Quantity:  60 tablet   Refills:  0   Dose:  5 mg    Last time this was given:  5 mg on 2/24/2017  6:03 AM   Instructions:  Take 1 tablet (5 mg total) by mouth every 6 (six) hours as needed for Pain.     Begin Date    AM    Noon    PM    Bedtime         CONTINUE taking these medications        Additional Info                      atorvastatin 20 MG tablet   Commonly known as:  LIPITOR   Refills:  0   Dose:  20 mg    Last time this was given:  20 mg on 2/23/2017  9:44 PM   Instructions:  Take 20 mg by mouth every evening.     Begin Date    AM    Noon    PM    Bedtime       cyclobenzaprine 10 MG tablet   Commonly known as:  FLEXERIL   Refills:  0   Dose:  10 mg    Instructions:  Take 10 mg by mouth.     Begin Date    AM    Noon    PM    Bedtime       estradiol 1 MG tablet   Commonly known as:  ESTRACE   Refills:  0   Dose:  1 mg    Instructions:  Take 1 mg by mouth once daily.     Begin Date    AM    Noon    PM    Bedtime       furosemide 20 MG tablet   Commonly known as:  LASIX   Refills:  0   Dose:  20  mg    Instructions:  Take 20 mg by mouth daily as needed.     Begin Date    AM    Noon    PM    Bedtime       gabapentin 300 MG capsule   Commonly known as:  NEURONTIN   Refills:  0   Dose:  300 mg    Last time this was given:  300 mg on 2/24/2017  4:27 AM   Instructions:  Take 300 mg by mouth 3 (three) times daily.     Begin Date    AM    Noon    PM    Bedtime       hydrocodone-acetaminophen 5-325mg 5-325 mg per tablet   Commonly known as:  NORCO   Refills:  0   Dose:  1 tablet    Last time this was given:  1 tablet on 2/24/2017  8:27 AM   Instructions:  Take 1 tablet by mouth every 6 (six) hours as needed for Pain.     Begin Date    AM    Noon    PM    Bedtime       losartan-hydrochlorothiazide 50-12.5 mg 50-12.5 mg per tablet   Commonly known as:  HYZAAR   Refills:  0   Dose:  1 tablet    Last time this was given:  1 tablet on 2/24/2017  8:04 AM   Instructions:  Take 1 tablet by mouth once daily.     Begin Date    AM    Noon    PM    Bedtime       potassium chloride 10 MEQ Tbsr   Commonly known as:  KLOR-CON   Refills:  0   Dose:  20 mEq    Instructions:  Take 20 mEq by mouth continuous prn.     Begin Date    AM    Noon    PM    Bedtime       ranitidine 150 MG capsule   Commonly known as:  ZANTAC   Refills:  0   Dose:  150 mg    Instructions:  Take 150 mg by mouth 2 (two) times daily.     Begin Date    AM    Noon    PM    Bedtime       trazodone 100 MG tablet   Commonly known as:  DESYREL   Refills:  0   Dose:  100 mg    Last time this was given:  100 mg on 2/23/2017 10:42 PM   Instructions:  Take 100 mg by mouth nightly as needed.     Begin Date    AM    Noon    PM    Bedtime         STOP taking these medications     ibuprofen 800 MG tablet   Commonly known as:  ADVIL,MOTRIN            Where to Get Your Medications      These medications were sent to [email or web address] [address] [place], [place] - [address] [facility] [address] of [facility] [address]    Hours:  24-hours Phone:   [phone]     naproxen 500 MG tablet         You can get these medications from any pharmacy     Bring a paper prescription for each of these medications     ondansetron 8 MG Tbdl    oxycodone 5 MG immediate release tablet                  Please bring to all follow up appointments:    1. A copy of your discharge instructions.  2. All medicines you are currently taking in their original bottles.  3. Identification and insurance card.    Please arrive 15 minutes ahead of scheduled appointment time.    Please call 24 hours in advance if you must reschedule your appointment and/or time.        Your Scheduled Appointments     Mar 09, 2017 10:00 AM CST   Post OP with RN, NEUROSURGERY   [facility] - Neurosurgery 7th Fl ([facility] )    [address]  [place] [address]   [phone]            Apr 04, 2017 10:15 AM CDT   Diagnostic Xray with [facility] XROP3 [address] LB LIMIT [facility] ([facility] )    [address] [facility] [address]   [phone]            Apr 04, 2017 11:30 AM CDT   Post OP with Derrick Lara MD   [facility] ([facility] )    [address]  [place] [address]   [phone]                  Discharge Instructions       Please follow ONLY the instructions that are checked below.    Activity Restrictions:  [x]  Return to work will be determined on an individual basis.  [x]  No lifting greater than 10 pounds.  [x]  Avoid bending and twisting the area of your surgery more than 45 degrees from neutral position in any direction.  [x]  No driving or operating machinery:  [x]  until cleared by your surgeon.  [x]  while taking narcotic pain medications or muscle relaxants.  []  No cervical collar, soft collar, or lumbar brace required.  []  Wear your brace at all times. You may be given an extra brace or soft collar to wear when showering.  [x]  Wear your brace at all times except when flat in bed.  []  Wear brace for comfort  only.  [x]  Increase ambulation over the next 2 weeks so that you are walking 2 miles per day at 2 weeks post-operatively.  [x]  Walk on paved surfaces only. It is okay to walk up and down stairs while holding onto a side rail.  [x]  No sexual activity for 2-3 weeks.    Discharge Medication/Follow-up:  [x]  Please refer to discharge medication reconciliation form.  []  Take Naproxen (NSAID) twice daily for two weeks.  [x]  Prescriptions for appropriate medication will be given upon discharge.   []  Pain control:             []  Muscle relaxer:            [x]  Take docusate (Colace 100 mg): take one capsule a day as needed for constipation. You can get this over the counter.  [x]  Follow-up appointment:  [x]  10-14 days post-op for wound check by physician assistant/nurse  [x]  4-6 weeks with MD:  [x]  with x-rays  []  without x-rays  [x]  An appointment will be mailed to you.    Wound Care:  []  Remove dressing or bandaid in    days.  [x]  No bandage required. Keep your incision open to the air.  [x]  You may shower on the 2nd day after your surgery. Have the force of water hit you opposite from the incision. Pat the incision dry after your shower; do not scrub the incision.  [x]  You cannot take a bath until 8 weeks after surgery.  []  Apply bacitracin to incision twice a day for    more days.    Call your doctor or go to the Emergency Room for any signs of infection, including: increased redness, drainage, pain, or fever (temperature ?101.5 for 24 hours). Call your doctor or go to the Emergency Room if there are any localized neurological changes; problems with speech, vision, numbness, tingling, weakness, or severe headache; or for other concerns.    Special Instructions:  [x]  No use of tobacco products.  [x]  Diet: Please eat a regular diet as tolerated.  []  Other diet:              Specific physician instructions:           Physicians need 3 days' notice for pain medicine to be refilled. Pain medicine will  only be refilled between 8 AM and 5 PM, Monday through Friday, due to Food and Drug Administration regulation of documentation.    If you have any questions about this form, please call [phone].    Form No. 74854 (Revised 10/31/2013)        Primary Diagnosis     Your primary diagnosis was:  Narrowing Of Cervical Spine      Admission Information     Date & Time Provider Department CSN    2/23/2017  7:33 AM Derrick Lara MD [facility] [id number]      Care Providers     Provider Role Specialty Primary office phone    Derrick Lara MD Attending Provider Neurosurgery [phone]    Derrick Lara MD Surgeon  Neurosurgery [phone]      Your Vitals Were     BP                   133/76 (BP Location: Right arm, Patient Position: Lying, BP Method: Automatic)           Recent Lab Values     No lab values to display.      Allergies as of 2/24/2017        Reactions    Codeine Hives    Corticosteroids (Glucocorticoids) Edema    Unsure of which steroids    Demerol [Meperidine] Other (See Comments)    Adverse reaction     Pcn [Penicillins]     Sulfa (Sulfonamide Antibiotics) Hives    Hives and trouble breathing       Advance Directives     An advance directive is a document which, in the event you are no longer able to make decisions for yourself, tells your healthcare team what kind of treatment you do or do not want to receive, or who you would like to make those decisions for you.  If you do not currently have an advance directive, [facility] encourages you to create one.  For more information call:  [phone]), [phone] ([phone]),  or log on to [email or web address].        Language Assistance Services     ATTENTION: Language assistance services are available, free of charge. Please call [phone].      ATENCIÓN: Si habla español, tiene a lockhart disposición servicios gratuitos de asistencia lingüística. Llame al [phone].     CHÚ Ý: N?u b?n nói Ti?ng Vi?t, có các d?ch  v? h? tr? ngôn ng? mi?n phí ghulamh cho b?n. G?i s? [phone].         [facility] complies with applicable Federal civil rights laws and does not discriminate on the basis of race, color, national origin, age, disability, or sex.

## 2017-02-23 NOTE — PROGRESS NOTES
Patient awake and alert. Voice no complaints. No s/s of distress noted. Daughter at bedside. All questions addressed. Verbalized understanding. Will continue to monitor patient

## 2017-02-23 NOTE — BRIEF OP NOTE
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 Item Serial No.  Lot No. LRB No. Used   DISC CERVICAL MOBI-C [id number] - [id number]  DISC CERVICAL MOBI-C [id number]  [employer] [id number] N/A 1   DISC CERVICAL MOBI-C [id number] - [id number]  DISC CERVICAL MOBI-C [id number]  [employer] [id number] N/A 1   SCREW QUICK START TSI 14MM DIS - [id number]   SCREW QUICK START TSI 14MM DIS   L[employer] [id number] N/A 1              Condition: Good    Disposition: PACU - hemodynamically stable.

## 2017-02-23 NOTE — PROGRESS NOTES
Dr. Baum at bedside. Verified with MD that no labs are needed for the case. MD states no labs needed. Verbalized understanding.

## 2017-02-24 VITALS
RESPIRATION RATE: 16 BRPM | BODY MASS INDEX: 29.53 KG/M2 | HEIGHT: 64 IN | SYSTOLIC BLOOD PRESSURE: 133 MMHG | DIASTOLIC BLOOD PRESSURE: 76 MMHG | OXYGEN SATURATION: 97 % | WEIGHT: 173 LBS | TEMPERATURE: 98 F | HEART RATE: 94 BPM

## 2017-02-24 PROCEDURE — 25000003 PHARM REV CODE 250: Performed by: NEUROLOGICAL SURGERY

## 2017-02-24 PROCEDURE — 25000003 PHARM REV CODE 250: Performed by: PHYSICIAN ASSISTANT

## 2017-02-24 PROCEDURE — 63600175 PHARM REV CODE 636 W HCPCS: Performed by: NEUROLOGICAL SURGERY

## 2017-02-24 RX ORDER — NAPROXEN 500 MG/1
500 TABLET ORAL 2 TIMES DAILY WITH MEALS
Status: DISCONTINUED | OUTPATIENT
Start: 2017-02-24 | End: 2017-02-24 | Stop reason: HOSPADM

## 2017-02-24 RX ORDER — NAPROXEN 500 MG/1
500 TABLET ORAL 2 TIMES DAILY WITH MEALS
Qty: 28 TABLET | Refills: 0 | Status: SHIPPED | OUTPATIENT
Start: 2017-02-24 | End: 2017-03-10

## 2017-02-24 RX ORDER — OXYCODONE HYDROCHLORIDE 5 MG/1
5 TABLET ORAL EVERY 6 HOURS PRN
Qty: 60 TABLET | Refills: 0 | Status: SHIPPED | OUTPATIENT
Start: 2017-02-24 | End: 2017-05-16

## 2017-02-24 RX ORDER — ONDANSETRON 8 MG/1
8 TABLET, ORALLY DISINTEGRATING ORAL EVERY 6 HOURS PRN
Qty: 30 TABLET | Refills: 0 | Status: SHIPPED | OUTPATIENT
Start: 2017-02-24 | End: 2017-05-16

## 2017-02-24 RX ADMIN — GABAPENTIN 300 MG: 300 CAPSULE ORAL at 04:02

## 2017-02-24 RX ADMIN — HYDROCODONE BITARTRATE AND ACETAMINOPHEN 1 TABLET: 5; 325 TABLET ORAL at 08:02

## 2017-02-24 RX ADMIN — OXYCODONE HYDROCHLORIDE 5 MG: 5 TABLET ORAL at 06:02

## 2017-02-24 RX ADMIN — CLINDAMYCIN IN 5 PERCENT DEXTROSE 900 MG: 18 INJECTION, SOLUTION INTRAVENOUS at 12:02

## 2017-02-24 RX ADMIN — HYDROCODONE BITARTRATE AND ACETAMINOPHEN 1 TABLET: 5; 325 TABLET ORAL at 04:02

## 2017-02-24 RX ADMIN — LOSARTAN POTASSIUM AND HYDROCHLOROTHIAZIDE 1 TABLET: 12.5; 5 TABLET ORAL at 08:02

## 2017-02-24 RX ADMIN — HYDROCODONE BITARTRATE AND ACETAMINOPHEN 1 TABLET: 5; 325 TABLET ORAL at 12:02

## 2017-02-24 RX ADMIN — NAPROXEN 500 MG: 500 TABLET ORAL at 09:02

## 2017-02-24 RX ADMIN — MORPHINE SULFATE 2 MG: 2 INJECTION, SOLUTION INTRAMUSCULAR; INTRAVENOUS at 12:02

## 2017-02-24 RX ADMIN — MORPHINE SULFATE 2 MG: 2 INJECTION, SOLUTION INTRAMUSCULAR; INTRAVENOUS at 01:02

## 2017-02-24 NOTE — PROGRESS NOTES
Progress Note  Neurosurgery    Admit Date: 2/23/2017  Post-operative Day: 1 Day Post-Op  Hospital Day: 2    SUBJECTIVE:     Harriett Gasca is a 51 y.o. female with a history of cervical stenosis with right sided radiculopathy complicated by CTS of the right, now s/p C4-5, C5-6 arthroplasty POD#1. Doing well post operatively. Bilateral hand  strength improved, as well as radicular pain. Tolerating diet. Voiding appropriately.    Follow-up For:  Procedure(s) (LRB):  CERVICAL TOTAL DISC REPLACEMENT (N/A)      Scheduled Meds:   atorvastatin  20 mg Oral QHS    bisacodyl  10 mg Rectal Daily    clindamycin (CLEOCIN) IVPB  900 mg Intravenous Q12H    furosemide  20 mg Oral Daily    gabapentin  300 mg Oral TID    losartan-hydrochlorothiazide 50-12.5 mg  1 tablet Oral Daily     Continuous Infusions:   0/9% NACL & POTASSIUM CHLORIDE 20 MEQ/L 100 mL/hr at 02/23/17 1551     PRN Meds:acetaminophen, aluminum-magnesium hydroxide-simethicone, fentaNYL, hydrocodone-acetaminophen 5-325mg, morphine, ondansetron, oxycodone, promethazine (PHENERGAN) IVPB, senna-docusate 8.6-50 mg, trazodone    Review of patient's allergies indicates:   Allergen Reactions    Codeine Hives    Corticosteroids (glucocorticoids) Edema     Unsure of which steroids    Demerol [meperidine] Other (See Comments)     Adverse reaction     Pcn [penicillins]     Sulfa (sulfonamide antibiotics) Hives     Hives and trouble breathing        OBJECTIVE:     Vital Signs (Most Recent)  Temp: 98.4 °F (36.9 °C) (02/24/17 0433)  Pulse: 94 (02/24/17 0757)  Resp: 16 (02/24/17 0757)  BP: 133/76 (02/24/17 0757)  SpO2: 97 % (02/24/17 0757)    Vital Signs Range (Last 24H):  Temp:  [98 °F (36.7 °C)-99.3 °F (37.4 °C)]   Pulse:  [83-96]   Resp:  [10-20]   BP: (133-160)/(69-89)   SpO2:  [93 %-100 %]     I & O (Last 24H):  Intake/Output Summary (Last 24 hours) at 02/24/17 0918  Last data filed at 02/24/17 0400   Gross per 24 hour   Intake             1860 ml   Output               296 ml   Net             1564 ml     Physical Exam:  General: well developed, well nourished, no distress.   Head: normocephalic, atraumatic  Neurologic: Alert and oriented. Thought content appropriate.  GCS: Motor: 6/Verbal: 5/Eyes: 4 GCS Total: 15  Mental Status: Awake, Alert, Oriented x 4  Language: No aphasia  Speech: No dysarthria  Cranial nerves: face symmetric, tongue midline, CN II-XII grossly intact.   Eyes: pupils equal, round, reactive to light with accomodation, EOMI.  Pulmonary: normal respirations, no signs of respiratory distress  Abdomen: soft, non-distended, not tender to palpation  Sensory: intact to light touch throughout    Motor Strength: Moves all extremities spontaneously with good tone.  No abnormal movements seen.     Strength  Deltoids Triceps Biceps Wrist Extension Wrist Flexion Hand    Upper: R 5/5 5/5 5/5 5/5 5/5 4/5    L 5/5 5/5 5/5 5/5 5/5 4/5     Iliopsoas Quadriceps Knee  Flexion Tibialis  anterior Gastro- cnemius EHL   Lower: R 5/5 5/5 5/5 5/5 5/5 5/5    L 5/5 5/5 5/5 5/5 5/5 5/5   Hand intrinsics 4/5 bilaterally.    DTR's: 2 + and symmetric in UE and LE  Pronator Drift: no drift noted  Finger-to-nose: Intact bilaterally  Betts: absent  Clonus: absent  Babinski: absent  Pulses: 2+ and symmetric radial and dorsalis pedis.  Skin: Skin is warm, dry and intact.    Lines/Drains:       Peripheral IV - Single Lumen 02/23/17 0805 Left Forearm (Active)   Site Assessment Clean;Dry;Intact;No redness;No swelling 2/23/2017  9:45 PM   Line Status Saline locked 2/23/2017  9:45 PM   Dressing Status Clean;Dry;Intact 2/23/2017  9:45 PM   Dressing Intervention Dressing changed 2/23/2017  3:30 PM   Reason Not Rotated Not due 2/23/2017  9:45 PM   Number of days:1            Peripheral IV - Single Lumen 02/23/17  Right Wrist (Active)   Site Assessment Clean;Dry;Intact;No redness;No swelling 2/23/2017  9:45 PM   Line Status Infusing 2/23/2017  9:45 PM   Dressing Status Clean;Dry;Intact  2/23/2017  9:45 PM   Dressing Intervention Dressing changed 2/23/2017  3:30 PM   Reason Not Rotated Not due 2/23/2017  9:45 PM   Number of days:1            Closed/Suction Drain 02/23/17 1502 Right Neck Other (Comment) 7 Fr. (Active)   Site Description Unable to view 2/23/2017  9:45 PM   Dressing Status Clean;Dry;Intact 2/23/2017  9:45 PM   Drainage Bloody 2/23/2017  9:45 PM   Status Other (Comment) 2/23/2017  9:45 PM   Output (mL) 9 mL 2/24/2017  4:00 AM   Number of days:0       Wound/Incision:  clean, dry, intact    Laboratory:  CBC:   Recent Labs  Lab 02/23/17  1529   WBC 5.91   RBC 4.43   HGB 12.5   HCT 37.5      MCV 85   MCH 28.2   MCHC 33.3     BMP:   Recent Labs  Lab 02/23/17  1529         K 4.2      CO2 22*   BUN 13   CREATININE 0.8   CALCIUM 7.9*     Diagnostic Imaging:  Xrays c spine reviewed and show appropriate placement of disc implants of C4-5, C5-6 without complication.    ASSESSMENT/PLAN:     51 y.o. female s/p C4-5, C5-6 arthroplasty POD#1.    - Neuro stable.  - Strength, radicular pain improved.  - Xrays show appropriate placement of hardware.  - C collar when up and OOB for one week, then OK to remove.  - Drain output 21cc. Removed without complication. Pt tolerated well.  - Naproxen for 2 weeks.  - Instructions were given verbally and written to patient and family. They voiced understanding. They were instructed to contact the clinic with any questions they might have prior to his follow up appointments.    Discussed with Dr. Lara.    Roz Puckett PA-C  Neurosurgery  Pager: [phone]

## 2017-02-24 NOTE — DISCHARGE INSTRUCTIONS
Please follow ONLY the instructions that are checked below.    Activity Restrictions:  [x]  Return to work will be determined on an individual basis.  [x]  No lifting greater than 10 pounds.  [x]  Avoid bending and twisting the area of your surgery more than 45 degrees from neutral position in any direction.  [x]  No driving or operating machinery:  [x]  until cleared by your surgeon.  [x]  while taking narcotic pain medications or muscle relaxants.  []  No cervical collar, soft collar, or lumbar brace required.  []  Wear your brace at all times. You may be given an extra brace or soft collar to wear when showering.  [x]  Wear your brace at all times except when flat in bed.  []  Wear brace for comfort only.  [x]  Increase ambulation over the next 2 weeks so that you are walking 2 miles per day at 2 weeks post-operatively.  [x]  Walk on paved surfaces only. It is okay to walk up and down stairs while holding onto a side rail.  [x]  No sexual activity for 2-3 weeks.    Discharge Medication/Follow-up:  [x]  Please refer to discharge medication reconciliation form.  []  Take Naproxen (NSAID) twice daily for two weeks.  [x]  Prescriptions for appropriate medication will be given upon discharge.   []  Pain control:             []  Muscle relaxer:            [x]  Take docusate (Colace 100 mg): take one capsule a day as needed for constipation. You can get this over the counter.  [x]  Follow-up appointment:  [x]  10-14 days post-op for wound check by physician assistant/nurse  [x]  4-6 weeks with MD:  [x]  with x-rays  []  without x-rays  [x]  An appointment will be mailed to you.    Wound Care:  []  Remove dressing or bandaid in    days.  [x]  No bandage required. Keep your incision open to the air.  [x]  You may shower on the 2nd day after your surgery. Have the force of water hit you opposite from the incision. Pat the incision dry after your shower; do not scrub the incision.  [x]  You cannot take a bath until 8 weeks  after surgery.  []  Apply bacitracin to incision twice a day for    more days.    Call your doctor or go to the Emergency Room for any signs of infection, including: increased redness, drainage, pain, or fever (temperature ?101.5 for 24 hours). Call your doctor or go to the Emergency Room if there are any localized neurological changes; problems with speech, vision, numbness, tingling, weakness, or severe headache; or for other concerns.    Special Instructions:  [x]  No use of tobacco products.  [x]  Diet: Please eat a regular diet as tolerated.  []  Other diet:              Specific physician instructions:           Physicians need 3 days' notice for pain medicine to be refilled. Pain medicine will only be refilled between 8 AM and 5 PM, Monday through Friday, due to Food and Drug Administration regulation of documentation.    If you have any questions about this form, please call [phone].    Form No. 93448 (Revised 10/31/2013)

## 2017-02-24 NOTE — NURSING TRANSFER
Nursing Transfer Note      2/23/2017     Transfer To: Xray then 748    Transfer via stretcher    Transfer with O2    Transported by RN    Medicines sent: IVF    Chart send with patient: Yes    Notified: daughter    Patient reassessed at: 2/23/17, 1930

## 2017-02-24 NOTE — PLAN OF CARE
Pt d/c home with family support. Family provide transportation home.    Future Appointments  Date Time Provider Department Center   3/9/2017 10:00 AM RN, NEUROSURGERY [facility] NEUROS7 [facility]   4/4/2017 10:15 AM [facility] XROP3 485 LB LIMIT [facility] XRAY OP [facility]   4/4/2017 11:30 AM Derrick Lara MD [facility] NEUROS[address] [place] [address]          02/24/17 1313   Final Note   Assessment Type Final Discharge Note   Discharge Disposition Home   Discharge planning education complete? Yes   What phone number can be called within the next 1-3 days to see how you are doing after discharge? [phone]   Hospital Follow Up  Appt(s) scheduled? Yes   Discharge plans and expectations educations in teach back method with documentation complete? Yes   Offered [facility] -- Bedside Delivery? n/a   Discharge/Hospital Encounter Summary to ([facility]) PCP n/a   Referral to Outpatient Case Management complete? n/a   Referral to / orders for Home Health Complete? n/a   30 day supply of medicines given at discharge, if documented non-compliance / non-adherence? n/a

## 2017-02-24 NOTE — PLAN OF CARE
Problem: Patient Care Overview  Goal: Plan of Care Review  Outcome: Ongoing (interventions implemented as appropriate)    02/24/17 9123   Coping/Psychosocial   Plan Of Care Reviewed With patient;daughter      Plan of care reviewed with patient and daughter on day of care. Vital signs stable. No neurologic changes during shift. Patient remains free of falls, trauma, and injury. Bed in lowest position, wheels locked, call light within reach, side rails x3 belongings within reach. Patient oriented to unit and plan of care board. Education provided. Patient educated to safety measures. No acute distress noted.     Problem: Fall Risk (Adult)  Intervention: Monitor/Assist with Self Care    02/23/17 0806 02/24/17 1000   Functional Level Current   Ambulation 0 - independent --    Transferring 0 - independent --    Toileting 0 - independent --    Bathing 0 - independent --    Dressing 0 - independent --    Eating 0 - independent --    Communication 0 - understands/communicates without difficulty --    Swallowing 0 - swallows foods/liquids without difficulty --    Activity   Activity Assistance Provided --  assistance, 1 person       Intervention: Reduce Risk/Promote Restraint Free Environment    02/24/17 1000   Safety Interventions   Safety Precautions emergency equipment at bedside       Intervention: Review Medications/Identify Contributors to Fall Risk    02/24/17 1124   Safety Interventions   Medication Review/Management medications reviewed       Intervention: Patient Rounds    02/24/17 1000   Safety Interventions   Patient Rounds bed in low position;bed wheels locked;call light in reach;clutter free environment maintained;ID band on;placement of personal items at bedside;toileting offered;visualized patient       Intervention: Safety Promotion/Fall Prevention    02/24/17 1000   Safety Interventions   Safety Promotion/Fall Prevention bed alarm set;assistive device/personal item within reach;Fall Risk reviewed with  patient/family;Fall Risk signage in place;medications reviewed       Intervention: Safety Precautions    02/24/17 1000   Safety Interventions   Safety Precautions emergency equipment at bedside         Goal: Identify Related Risk Factors and Signs and Symptoms  Related risk factors and signs and symptoms are identified upon initiation of Human Response Clinical Practice Guideline (CPG)   Outcome: Ongoing (interventions implemented as appropriate)    02/24/17 1124   Fall Risk   Related Risk Factors (Fall Risk) environment unfamiliar   Signs and Symptoms (Fall Risk) presence of risk factors       Goal: Absence of Falls  Patient will demonstrate the desired outcomes by discharge/transition of care.   Outcome: Ongoing (interventions implemented as appropriate)    02/24/17 1124   Fall Risk (Adult)   Absence of Falls making progress toward outcome

## 2017-02-27 NOTE — OP NOTE
DATE OF PROCEDURE:  02/23/2017    PREOPERATIVE DIAGNOSIS:  C4-C5, C5-C6 herniated nucleus pulposus with   radiculopathy.    POSTOPERATIVE DIAGNOSIS:  C4-C5, C5-C6 herniated nucleus pulposus with   radiculopathy.    OPERATIVE PROCEDURE UNDERGONE:  Anterior approach for C4-C5, C5-C6 total disk   arthroplasty with microsurgical technique.    SURGEON:  Derrick Lara M.D.    ASSISTANT:  Aakash Baum M.D.(RES)    ANESTHESIA:  General.    INDICATIONS FOR PROCEDURE:  This is a 51-year-old female with intractable neck   pain and radiculopathy, who failed conservative management and we felt the   patient would benefit from surgical intervention.    OPERATIVE NOTE:  The patient was taken to Operating Room, anesthetized and   intubated by Anesthesia.  Preop antibiotics were administered.  The neck was   placed in slight extension.  The neck was prepped and draped in sterile fashion.    Fluoroscopy was used to localize the appropriate level.  A transverse incision   was made in the right side.  Dissection was taken down through the platysmus   and going medial to the sternocleidomastoid.  We found a very large external   jugular vein.  We were able to mobilize that and then dissected medial to the   carotid sheath, mobilizing the carotid sheath and its content laterally, the   trachea and the esophagus medially mobilizing the longus colli, then placed in   self-retaining retractor.  Reconfirmed we were at the appropriate level at   C5-C6.  Self-retaining retractors put into place.  We performed a partial   diskectomy at C5-C6, placed in distracted pins, brought in the microscope.    Completed the complete diskectomy, drilled through the posterior osteophyte,   removed the posterior longitudinal ligament, performed bilateral foraminotomy,   prepared the endplate and under AP and lateral fluoroscopy, we placed a 13 x 17   x 5 mm Mobi-C articial disk into position, compressed, removed the distraction pins   up to the level of C4-C5  performed diskectomy in the same fashion as we did with   C5-C6, going through the PLL, bilateral foraminotomy, prepared the endplate,   placed a 13 x 17 x 5 mm disk arthroplasty to position, irrigated out the wound   and confirmed.  Neuromonitoring was stable.  Removed the distraction pins,   placed a TLS drain in place, closed the wound in layers.  Sterile dressing was   put in place.  The patient was extubated and brought to Recovery Room without   any problems or complication.  EBL was minimum.          / [id number] blank(s)        EZRA/IN  dd: 02/26/2017 22:00:42 (CST)  td: 02/26/2017 22:51:55 (CST)  Doc ID   #[id number]  Job ID #[id number]    CC:

## 2017-03-02 NOTE — DISCHARGE SUMMARY
[facility]  Discharge Summary  Neurosurgery    Admit Date: 2/23/2017    Discharge Date and Time: 2/24/2017 12:42 PM    Attending Physician: Dr. Lara    Discharge Physician: Roz Puckett    Reason for Admission: This is a 51-year-old female with intractable neck pain and radiculopathy, who failed conservative management and we felt the patient would benefit from surgical intervention.    Procedures Performed: Procedure(s) (LRB):  CERVICAL TOTAL DISC REPLACEMENT (N/A)    Hospital Course Harriett Gasca presented to [facility] on 2/23/2017 for the above stated procedure. she tolerated the procedure well and there were no intra-operative complications. she recovered in PACU and was then transferred to the floor, where her pain was controlled. Post-op xrays showed good placement of the hardware. she was evaluated by PT/OT who recommended discharge home with family support. TLS drain removed 2/24/17 without complication. On 2/24/2017, she was discharged home with pain medication and follow up appointments. Regular diet. Activity as tolerated. At the time of discharge, vital signs were stable, patient was afebrile and neurologically stable. Discharge instructions were given verbally/written to the patient and her family and all of their questions were answered. Patient and family voiced understanding. They were encouraged to call the clinic with any questions they might have prior to the follow up appointments.    Final Diagnoses:     Discharged Condition: good   Principal Problem: Cervical stenosis of spinal canal   Secondary Diagnoses:   Active Hospital Problems    Diagnosis  POA    *Cervical stenosis of spinal canal [M48.02]  Yes      Resolved Hospital Problems    Diagnosis Date Resolved POA   No resolved problems to display.       Disposition: Home or Self Care    Follow Up/Patient Instructions: See the patient instruction tab for detailed discharge instructions and follow up  information.    Medications:  Reconciled Home Medications:   Discharge Medication List as of 2/24/2017 11:27 AM      START taking these medications    Details   naproxen (NAPROSYN) 500 MG tablet Take 1 tablet (500 mg total) by mouth 2 (two) times daily with meals., Starting 2/24/2017, Until Fri 3/10/17, Normal      ondansetron (ZOFRAN-ODT) 8 MG TbDL Take 1 tablet (8 mg total) by mouth every 6 (six) hours as needed (nausea, vomiting)., Starting 2/24/2017, Until Discontinued, Print      oxycodone (ROXICODONE) 5 MG immediate release tablet Take 1 tablet (5 mg total) by mouth every 6 (six) hours as needed for Pain., Starting 2/24/2017, Until Discontinued, Print         CONTINUE these medications which have NOT CHANGED    Details   atorvastatin (LIPITOR) 20 MG tablet Take 20 mg by mouth every evening. , Until Discontinued, Historical Med      cyclobenzaprine (FLEXERIL) 10 MG tablet Take 10 mg by mouth., Until Discontinued, Historical Med      estradiol (ESTRACE) 1 MG tablet Take 1 mg by mouth once daily., Until Discontinued, Historical Med      furosemide (LASIX) 20 MG tablet Take 20 mg by mouth daily as needed. , Until Discontinued, Historical Med      gabapentin (NEURONTIN) 300 MG capsule Take 300 mg by mouth 3 (three) times daily., Until Discontinued, Historical Med      hydrocodone-acetaminophen 5-325mg (NORCO) 5-325 mg per tablet Take 1 tablet by mouth every 6 (six) hours as needed for Pain., Until Discontinued, Historical Med      losartan-hydrochlorothiazide 50-12.5 mg (HYZAAR) 50-12.5 mg per tablet Take 1 tablet by mouth once daily., Until Discontinued, Historical Med      potassium chloride (KLOR-CON) 10 MEQ TbSR Take 20 mEq by mouth continuous prn. , Until Discontinued, Historical Med      ranitidine (ZANTAC) 150 MG capsule Take 150 mg by mouth 2 (two) times daily. , Until Discontinued, Historical Med      trazodone (DESYREL) 100 MG tablet Take 100 mg by mouth nightly as needed. , Until Discontinued, Historical  Med         STOP taking these medications       ibuprofen (ADVIL,MOTRIN) 800 MG tablet Comments:   Reason for Stopping:             No discharge procedures on file.

## 2017-03-09 ENCOUNTER — CLINICAL SUPPORT (OUTPATIENT)
Dept: NEUROSURGERY | Facility: CLINIC | Age: 52
End: 2017-03-09
Payer: MEDICAID

## 2017-03-09 VITALS
HEART RATE: 94 BPM | HEIGHT: 64 IN | DIASTOLIC BLOOD PRESSURE: 88 MMHG | BODY MASS INDEX: 28.99 KG/M2 | SYSTOLIC BLOOD PRESSURE: 137 MMHG | WEIGHT: 169.81 LBS

## 2017-03-09 PROCEDURE — 99213 OFFICE O/P EST LOW 20 MIN: CPT | Mod: PBBFAC

## 2017-03-09 PROCEDURE — 99999 PR PBB SHADOW E&M-EST. PATIENT-LVL III: CPT | Mod: PBBFAC,,,

## 2017-03-09 NOTE — PROGRESS NOTES
Patient seen 2 weeks post op C4-5, C5-6 arthroplasty on 2/23/17 with Dr. Lara. Patient ambulating with [place] J collar in place. Incision to right anterior neck assessed. Dissolvable sutures intact. Edges well approximated. No erythema, swelling, or drainage noted. Instructed patient to keep incision open to air, no scrubbing incision, no submerging incision in tub bath/pool for 6 more weeks, and pat to dry. Patient denies any new numbness or weakness. Discharge instructions reviewed including no lifting greater than 10 pounds, okay to remove brace, no driving while taking narcotic pain medication/muscle relaxants, and continue to increase ambulation daily as tolerated. Patient verbalized understanding of all instructions given. 6 week post op appointment reviewed and provided in print. Encouraged to call clinic with any questions or concerns.

## 2017-04-03 ENCOUNTER — TELEPHONE (OUTPATIENT)
Dept: NEUROSURGERY | Facility: CLINIC | Age: 52
End: 2017-04-03

## 2017-04-03 ENCOUNTER — PATIENT MESSAGE (OUTPATIENT)
Dept: NEUROSURGERY | Facility: CLINIC | Age: 52
End: 2017-04-03

## 2017-04-03 NOTE — TELEPHONE ENCOUNTER
----- Message from Deja Huang sent at 4/3/2017  2:01 PM CDT -----  Contact: pt  Pt called to request a reschedule of her appointment schedule for tomorrow the patient would like a call back to see when will be her next appointment .    Pt can be contacted at [phone]

## 2017-05-16 ENCOUNTER — HOSPITAL ENCOUNTER (OUTPATIENT)
Dept: RADIOLOGY | Facility: HOSPITAL | Age: 52
Discharge: HOME | End: 2017-05-16
Attending: NEUROLOGICAL SURGERY
Payer: MEDICAID

## 2017-05-16 ENCOUNTER — OFFICE VISIT (OUTPATIENT)
Dept: NEUROSURGERY | Facility: CLINIC | Age: 52
End: 2017-05-16
Payer: MEDICAID

## 2017-05-16 VITALS
HEART RATE: 106 BPM | HEIGHT: 64 IN | DIASTOLIC BLOOD PRESSURE: 77 MMHG | WEIGHT: 175 LBS | SYSTOLIC BLOOD PRESSURE: 125 MMHG | BODY MASS INDEX: 29.88 KG/M2 | TEMPERATURE: 98 F

## 2017-05-16 DIAGNOSIS — G56.03 BILATERAL CARPAL TUNNEL SYNDROME: ICD-10-CM

## 2017-05-16 DIAGNOSIS — M54.12 CERVICAL RADICULOPATHY: ICD-10-CM

## 2017-05-16 DIAGNOSIS — M48.02 CERVICAL STENOSIS OF SPINAL CANAL: Primary | ICD-10-CM

## 2017-05-16 PROCEDURE — 99999 PR PBB SHADOW E&M-EST. PATIENT-LVL III: CPT | Mod: PBBFAC,,, | Performed by: NEUROLOGICAL SURGERY

## 2017-05-16 PROCEDURE — 99213 OFFICE O/P EST LOW 20 MIN: CPT | Mod: PBBFAC,25 | Performed by: NEUROLOGICAL SURGERY

## 2017-05-16 PROCEDURE — 99024 POSTOP FOLLOW-UP VISIT: CPT | Mod: ,,, | Performed by: NEUROLOGICAL SURGERY

## 2017-05-16 PROCEDURE — 72050 X-RAY EXAM NECK SPINE 4/5VWS: CPT | Mod: 26,,, | Performed by: RADIOLOGY

## 2017-05-16 NOTE — PROGRESS NOTES
Subjective:    I, Maya Elaine, attest that this documentation has been prepared under the direction and in the presence of TAZ Lara MD.     Patient ID: Harriett Gasca is a 51 y.o. female.    Chief Complaint: Post-op Evaluation    HPI   Pt is a 51 y.o. female s/p total disc arthroplasty at C4-5, C5-6 done on 2/23/2017. who presents for follow up evaluation. Pt continues to complains of arm pain and neck pain, unimproved since surgery. She also mentions intermittent swallowing difficulty since surgery that is sometimes severe.     Review of Systems   Constitutional: Negative for chills, fatigue and fever.   HENT: Positive for trouble swallowing. Negative for sinus pressure.    Eyes: Negative.  Negative for visual disturbance.   Respiratory: Negative.    Cardiovascular: Negative.    Gastrointestinal: Negative.  Negative for nausea and vomiting.   Endocrine: Negative.    Genitourinary: Negative.    Musculoskeletal: Positive for neck pain.        Positive for bilateral forearm and hand pain.    Neurological: Negative for dizziness, seizures, syncope, speech difficulty and numbness.   Psychiatric/Behavioral: Positive for sleep disturbance (secondary to hand pain).       Objective:      Physical Exam:  Nursing note and vitals reviewed.    Constitutional: She appears well-developed.     Eyes: Pupils are equal, round, and reactive to light. Conjunctivae and EOM are normal.     Cardiovascular: Normal rate, regular rhythm, normal pulses and intact distal pulses.     Abdominal: Soft.     Psych/Behavior: She is alert. She is oriented to person, place, and time. She has a normal mood and affect.     Musculoskeletal: Gait is normal.        Neck: Range of motion is full. There is no tenderness. Muscle strength is 5/5. Tone is normal.        Back: Range of motion is full. There is no tenderness. Muscle strength is 5/5. Tone is normal.        Right Upper Extremities: Range of motion is full. There is no tenderness. Muscle  strength is 5/5. Tone is normal.        Left Upper Extremities: Range of motion is full. There is no tenderness. Muscle strength is 5/5. Tone is normal.       Right Lower Extremities: Range of motion is full. There is no tenderness. Muscle strength is 5/5. Tone is normal.        Left Lower Extremities: Range of motion is full. There is no tenderness. Muscle strength is 5/5. Tone is normal.     Neurological:        Coordination: She has a normal Romberg Test, normal finger to nose coordination, normal heel to shin coordination and normal tandem walking coordination.        DTRs: DTRs are normal. Tricep reflexes are 2+ on the right side and 2+ on the left side. Bicep reflexes are 2+ on the right side and 2+ on the left side. Brachioradialis reflexes are 2+ on the right side and 2+ on the left side. Patellar reflexes are 2+ on the right side and 2+ on the left side. Achilles reflexes are 2+ on the right side and 2+ on the left side.        Cranial nerves: Cranial nerve(s) II, III, IV, V, VI, VII, VIII, IX, X, XI and XII are intact.       The wound is well-healed.   Pt continues to have some evidence of radiculopathy.   Unfortunately she also has concomitant bilateral carpal tunnel and hand pain that wakes her up at night.     Imaging:   X-ray C-spine with Flexion/Extension, dated 05/16/2017 shows hardware is in good position, good preservation of motion on flexion and extension.     EMG, dated 2/21/2017, does confirm bilateral carpal tunnel.     Assessment/Plan:   Pt s/p 2 level disc arthroplasty, doing well from that perspective, but still has some radiculopathy. I think this just irritation of the nerve which will improve with time. I think that having overlapping carpal tunnel is an issue. Would like to get her into PT, try medrol dose pack, get her wrist splints. If she doesn't improve then we will try wrist injection and most likely need bilateral carpal tunnel release. We will have her see Dr. Singh in 4 weeks for  evaluation of carpal tunnel.     I, TAZ Lara MD, personally performed the services described in this documentation. All medical record entries made by the scribe, Maya Elaine, were at my direction and in my presence.  I have reviewed the chart and agree that the record reflects my personal performance and is accurate and complete.

## 2017-05-16 NOTE — LETTER
May 19, 2017      Mayela Hendrix  [address] [place] [address]           [facility] Neuro[facility] 7th Fl  [address]  [facility] [address]  Phone: [phone]          Patient: Harriett Gasca   MR Number: [id number]   YOB: 1965   Date of Visit: 5/16/2017       Dear Mayela Hendrix:    Thank you for referring Harriett Gasca to me for evaluation. Attached you will find relevant portions of my assessment and plan of care.    If you have questions, please do not hesitate to call me. I look forward to following Harriett Gasca along with you.    Sincerely,        Enclosure  CC:  No Recipients    If you would like to receive this communication electronically, please contact [email or web address] or [phone] to request more information on [employer] access.    For providers and/or their staff who would like to refer a patient to [facility], please contact us through our one-stop-shop provider referral line, [facility], at [phone].    If you feel you have received this communication in error or would no longer like to receive these types of communications, please e-mail [email or web address]

## 2017-05-17 RX ORDER — METHYLPREDNISOLONE 4 MG/1
TABLET ORAL
Qty: 1 PACKAGE | Refills: 0 | Status: SHIPPED | OUTPATIENT
Start: 2017-05-17 | End: 2022-04-25

## 2017-06-02 ENCOUNTER — TELEPHONE (OUTPATIENT)
Dept: NEUROSURGERY | Facility: CLINIC | Age: 52
End: 2017-06-02

## 2017-06-02 NOTE — TELEPHONE ENCOUNTER
----- Message from Gloria Harmon sent at 5/31/2017  4:25 PM CDT -----  Contact: Pt  Pt states she found a PT company that accepts her insurance orders will need to be faxed over [facility] Ph# [phone] Fax# [phone]    Also pts arm splint wasn't sent to the pharmacy [email or web address] [phone] (Phone)  [phone] (Fax)    Pt contact number [phone]  Thanks

## 2018-05-10 ENCOUNTER — TELEPHONE (OUTPATIENT)
Dept: NEUROSURGERY | Facility: CLINIC | Age: 53
End: 2018-05-10

## 2018-05-10 NOTE — TELEPHONE ENCOUNTER
Told them to fax a medical record release to medical records and provided them with the fax number

## 2018-05-10 NOTE — TELEPHONE ENCOUNTER
----- Message from Kirstie Mayo sent at 5/10/2018 10:41 AM CDT -----  Contact: Lucila ([facility]) [phone]  Patient Requesting Report    Who Called: Lucila   Report Needed: EMG  Communication Preference : Please fax the report to [phone]

## 2018-10-10 ENCOUNTER — PATIENT MESSAGE (OUTPATIENT)
Dept: NEUROSURGERY | Facility: CLINIC | Age: 53
End: 2018-10-10

## 2019-06-20 PROBLEM — G89.29 CHRONIC MIDLINE LOW BACK PAIN: Status: ACTIVE | Noted: 2019-06-20

## 2019-06-20 PROBLEM — M54.50 CHRONIC MIDLINE LOW BACK PAIN: Status: ACTIVE | Noted: 2019-06-20

## 2021-01-26 ENCOUNTER — HISTORICAL (OUTPATIENT)
Dept: ADMINISTRATIVE | Facility: HOSPITAL | Age: 56
End: 2021-01-26

## 2021-03-15 ENCOUNTER — HISTORICAL (OUTPATIENT)
Dept: RADIOLOGY | Facility: HOSPITAL | Age: 56
End: 2021-03-15

## 2021-03-18 ENCOUNTER — HISTORICAL (OUTPATIENT)
Dept: LAB | Facility: HOSPITAL | Age: 56
End: 2021-03-18

## 2021-07-27 ENCOUNTER — HISTORICAL (OUTPATIENT)
Dept: RADIOLOGY | Facility: HOSPITAL | Age: 56
End: 2021-07-27

## 2021-12-06 ENCOUNTER — PATIENT MESSAGE (OUTPATIENT)
Dept: RESEARCH | Facility: HOSPITAL | Age: 56
End: 2021-12-06
Payer: MEDICAID

## 2021-12-28 PROBLEM — M54.41 CHRONIC MIDLINE LOW BACK PAIN WITH RIGHT-SIDED SCIATICA: Status: ACTIVE | Noted: 2019-06-20

## 2022-04-07 ENCOUNTER — HISTORICAL (OUTPATIENT)
Dept: ADMINISTRATIVE | Facility: HOSPITAL | Age: 57
End: 2022-04-07
Payer: MEDICAID

## 2022-04-24 VITALS
WEIGHT: 187.38 LBS | DIASTOLIC BLOOD PRESSURE: 87 MMHG | OXYGEN SATURATION: 100 % | HEIGHT: 64 IN | BODY MASS INDEX: 31.99 KG/M2 | SYSTOLIC BLOOD PRESSURE: 138 MMHG

## 2022-05-18 PROBLEM — M54.16 LUMBAR RADICULOPATHY: Status: ACTIVE | Noted: 2022-05-18

## 2023-01-01 NOTE — NURSING
Patient is adequate for discharge. IV access has been discontinued and education has been provided. Patient will be transported via wheelchair. Will continue to monitor.  
0

## 2023-08-30 PROBLEM — G43.109 MIGRAINE WITH AURA AND WITHOUT STATUS MIGRAINOSUS, NOT INTRACTABLE: Status: ACTIVE | Noted: 2023-08-30

## 2023-08-30 PROBLEM — G44.059 SUNCT (SHORT UNILATERAL NEURALGIFORM HEADACHE, CONJUNCTIVAL INJ/TEAR): Status: ACTIVE | Noted: 2023-08-30

## 2023-08-30 PROBLEM — G45.9 TIA (TRANSIENT ISCHEMIC ATTACK): Status: ACTIVE | Noted: 2023-08-30

## 2023-12-04 PROBLEM — G45.9 TIA (TRANSIENT ISCHEMIC ATTACK): Status: RESOLVED | Noted: 2023-08-30 | Resolved: 2023-12-04

## (undated) DEVICE — DRESSING TELFA N ADH 3X8

## (undated) DEVICE — DRESSING SURGICAL 1/2X1/2

## (undated) DEVICE — GAUZE SPONGE PEANUT STRL

## (undated) DEVICE — DIFFUSER

## (undated) DEVICE — DRAPE INCISE IOBAN 2 23X17IN

## (undated) DEVICE — SEE MEDLINE ITEM 156905

## (undated) DEVICE — KIT SURGIFLO EVITHROM

## (undated) DEVICE — DRAPE STERI INSTRUMENT 1018

## (undated) DEVICE — BUR BONE CUT MICRO TPS 3X3.8MM

## (undated) DEVICE — DRESSING TRANS 4X4 TEGADERM

## (undated) DEVICE — DRAPE THYROID WITH ARMBOARD

## (undated) DEVICE — DRUG BACITRACIN ZINC

## (undated) DEVICE — SPONGE GAUZE 16PLY 4X4

## (undated) DEVICE — DRAIN TLS 7MM POREX

## (undated) DEVICE — MARKER SKIN STND TIP BLUE BARR

## (undated) DEVICE — CORD BIPOLAR 12 FOOT

## (undated) DEVICE — SUT MONOCRYL 4-0 PS-1 UND

## (undated) DEVICE — NDL SPINAL 18GX3.5 SPINOCAN

## (undated) DEVICE — SCREW QUICK START TSI 14MM DIS
Type: IMPLANTABLE DEVICE | Site: NECK | Status: NON-FUNCTIONAL
Removed: 2017-02-23

## (undated) DEVICE — SUT CTD VICRYL 3-0 CR/SH

## (undated) DEVICE — TUBE FRAZIER 5MM 2FT SOFT TIP

## (undated) DEVICE — DURAPREP SURG SCRUB 26ML

## (undated) DEVICE — DRAPE C ARM 42 X 120 10/BX

## (undated) DEVICE — SEE MEDLINE ITEM 146292

## (undated) DEVICE — DRAPE OPMI STERILE

## (undated) DEVICE — ELECTRODE REM PLYHSV RETURN 9

## (undated) DEVICE — CARTRIDGE OIL